# Patient Record
Sex: FEMALE | Race: ASIAN | Employment: FULL TIME | URBAN - METROPOLITAN AREA
[De-identification: names, ages, dates, MRNs, and addresses within clinical notes are randomized per-mention and may not be internally consistent; named-entity substitution may affect disease eponyms.]

---

## 2019-06-03 LAB
HBV SURFACE AG SERPL QL IA: NONREACTIVE
HIV 1+2 AB+HIV1 P24 AG SERPL QL IA: NON REACTIVE
RUBELLA ABY IGG: NORMAL
RUBELLA ANTIBODY IGG QUANTITATIVE: 64 IU/ML

## 2019-07-08 LAB
C TRACH DNA SPEC QL PROBE+SIG AMP: NEGATIVE
N GONORRHOEA DNA SPEC QL PROBE+SIG AMP: NEGATIVE

## 2019-09-17 ENCOUNTER — TRANSCRIBE ORDERS (OUTPATIENT)
Dept: MATERNAL FETAL MEDICINE | Facility: CLINIC | Age: 39
End: 2019-09-17

## 2019-09-17 DIAGNOSIS — O26.90 PREGNANCY RELATED CONDITION, ANTEPARTUM: Primary | ICD-10-CM

## 2019-09-19 ENCOUNTER — PRE VISIT (OUTPATIENT)
Dept: MATERNAL FETAL MEDICINE | Facility: CLINIC | Age: 39
End: 2019-09-19

## 2019-09-26 ENCOUNTER — HOSPITAL ENCOUNTER (OUTPATIENT)
Dept: ULTRASOUND IMAGING | Facility: CLINIC | Age: 39
Discharge: HOME | End: 2019-09-26
Attending: OBSTETRICS & GYNECOLOGY | Admitting: OBSTETRICS & GYNECOLOGY
Payer: COMMERCIAL

## 2019-09-26 ENCOUNTER — OFFICE VISIT (OUTPATIENT)
Dept: MATERNAL FETAL MEDICINE | Facility: CLINIC | Age: 39
End: 2019-09-26
Attending: OBSTETRICS & GYNECOLOGY
Payer: COMMERCIAL

## 2019-09-26 DIAGNOSIS — O26.90 PREGNANCY RELATED CONDITION, ANTEPARTUM: ICD-10-CM

## 2019-09-26 DIAGNOSIS — O09.522 MULTIGRAVIDA OF ADVANCED MATERNAL AGE IN SECOND TRIMESTER: Primary | ICD-10-CM

## 2019-09-26 PROCEDURE — 76811 OB US DETAILED SNGL FETUS: CPT

## 2019-09-26 NOTE — PROGRESS NOTES
"Please see \"Imaging\" tab under \"Chart Review\" for details of today's US.    Mounika Cosme, DO    "

## 2019-11-11 ENCOUNTER — TRANSFERRED RECORDS (OUTPATIENT)
Dept: HEALTH INFORMATION MANAGEMENT | Facility: CLINIC | Age: 39
End: 2019-11-11

## 2019-11-11 LAB — GLU GEST SCREEN 1HR 50G: 94

## 2019-11-20 ENCOUNTER — OFFICE VISIT (OUTPATIENT)
Dept: OBGYN | Facility: CLINIC | Age: 39
End: 2019-11-20
Attending: ADVANCED PRACTICE MIDWIFE
Payer: COMMERCIAL

## 2019-11-20 VITALS
HEIGHT: 60 IN | WEIGHT: 138.2 LBS | HEART RATE: 91 BPM | BODY MASS INDEX: 27.13 KG/M2 | DIASTOLIC BLOOD PRESSURE: 65 MMHG | SYSTOLIC BLOOD PRESSURE: 99 MMHG

## 2019-11-20 DIAGNOSIS — Z34.03 NORMAL FIRST PREGNANCY IN THIRD TRIMESTER: Primary | ICD-10-CM

## 2019-11-20 PROCEDURE — G0463 HOSPITAL OUTPT CLINIC VISIT: HCPCS | Mod: ZF

## 2019-11-20 SDOH — HEALTH STABILITY: MENTAL HEALTH: HOW OFTEN DO YOU HAVE A DRINK CONTAINING ALCOHOL?: NEVER

## 2019-11-20 ASSESSMENT — ANXIETY QUESTIONNAIRES
GAD7 TOTAL SCORE: 2
1. FEELING NERVOUS, ANXIOUS, OR ON EDGE: SEVERAL DAYS
5. BEING SO RESTLESS THAT IT IS HARD TO SIT STILL: NOT AT ALL
3. WORRYING TOO MUCH ABOUT DIFFERENT THINGS: SEVERAL DAYS
7. FEELING AFRAID AS IF SOMETHING AWFUL MIGHT HAPPEN: NOT AT ALL
6. BECOMING EASILY ANNOYED OR IRRITABLE: NOT AT ALL
2. NOT BEING ABLE TO STOP OR CONTROL WORRYING: NOT AT ALL

## 2019-11-20 ASSESSMENT — MIFFLIN-ST. JEOR: SCORE: 1223.37

## 2019-11-20 ASSESSMENT — PATIENT HEALTH QUESTIONNAIRE - PHQ9
SUM OF ALL RESPONSES TO PHQ QUESTIONS 1-9: 1
5. POOR APPETITE OR OVEREATING: NOT AT ALL

## 2019-11-20 NOTE — PROGRESS NOTES
"[facility]  New Obstetrics Visit    HPI: 39 year old  at 29w4d by 8w2d US here today for initial OB visit, transfer of care from [facility]; pt reports she did not want to deliver at [facility] due to high  rate, her  had suggested delivering at [facility] instead.     Shavonne Vogel reports that overall she is feeling well. She does feel fatigued but baby is moving well, no VB, leaking of fluid, no contractions. She has been having some heartburn, taking Tums PRN. No HA, vision changes, does endorse some SOB, no RUQ, LE swelling. No issues with constipation.     Reports she is still trying to decide about CNM vs. MD care. She has a  already who has been in deliveries at [facility]. Strongly desires medication-free labor but understands the need for  in certain situations. Interested in water birth.     OBHx  OB History    Para Term  AB Living   1 0 0 0 0 0   SAB TAB Ectopic Multiple Live Births   0 0 0 0 0      # Outcome Date GA Lbr Octavio/2nd Weight Sex Delivery Anes PTL Lv   1 Current                GYN History  - LMP: Patient's last menstrual period was 2019.  - Pap Smears: NILM in 3/2019, in her 20s had HPV, High grade dysplasia, had LEEP under general anesthesia   - Sexual Activity: yes, no dyspareunia  - Hx STIs: no  - Hx UTIs: intermittent UTIs, nothing recurrent     PMHx: none   PSHx: LEEP 10+ years ago   Meds:  Prenatal vitamin  Allergies:  NKDA    SocHx: Lives w  \"Ahsan\", , born in [facility] [place], moved in [place] at age 9, went to [employer], [employer] @ [employer]    FamHx: No hx of breast, ovarian, endometrial, colon cancers    ROS: 10-Point ROS negative except as noted in HPI    Preventative Health  Current or historical sexual, physical or mental abuse: none  Feelings of depression: none  Seat belt usage: yes   Diet: no  Exercise: 3-4 days per week, prenatal yoga, barre     Physical Exam  BP 99/65 (BP Location: Right arm, Patient " Position: Chair)   Pulse 91   Ht 1.524 m (5')   Wt 62.7 kg (138 lb 3.2 oz)   LMP 2019   Breastfeeding No   BMI 26.99 kg/m    Gen: Well-appearing, NAD  HEENT: Normocephalic, atraumatic  CV:  Regular rate, well perfused  Pulm: Breathing comfortably on room air  Abd: Soft, non-tender, non-distended  Ext: No LE edema, extremities warm and well perfused    FHT: 151 bpm  Fundal height: 30cm    Labs:  none    Assessment/Plan:  Ms. Shavonne Vogel is a 39 year old  at 29w4d by 8w2d US, here for new OB visit, CHE from Clinic [facility]. Pregnancy is complicated by AMA.     1. PNC: routine ob labs reviewed  - Rh pos, Rubella immune  - GCT, CBC, RPR wnl  - s/p flu shot, Tdap  - pap NILM in 3/2019, HPV neg  - vitamin D ordered today     2. Genetic screening/ diagnosis: low risk NIPT  - choroid plexus cysts and external intracardiac focus seen on outside US, EIF seen on Level 2 scan, can be normal variant in fetuses of  descent. No CPCs seen on Level 2 scan.   - Carrier screen neg    3. Transfer of care/undecided about model of care  - oriented patient to collaborative MD-CNM aspect of practice, reviewed call schedules, structure of prenatal visits  - provided with information on reasons for induction, indications for  section   - pt has already been on hospital tour  - recommended consultation w midwife group re: interest in water birth    Follow up in 2 weeks.    Staffed w Dr. Heber Putnam MD  Obstetrics and Gynecology PGY-1  19    I saw this patient with the resident and agree with the resident s findings and plan of care as documented in the resident s note.  I personally reviewed prenatal care to date.  Key findings: trying to find best practice model for her desires for prenatal care, discussed our clinics approach to care, CNM and MD approach to care, plans to meet with CNMS to discuss water birth consideration.  Aware of collaborative practice between CNM and  MD.    Sri Buck MD

## 2019-11-20 NOTE — LETTER
"2019       RE: Shavonne Vogel  [address]  [place] [address]     Dear Colleague,    Thank you for referring your patient, Shavonne Vogel, to the [facility] at [facility]. Please see a copy of my visit note below.    [email or web address]  New Obstetrics Visit    HPI: 39 year old  at 29w4d by 8w2d US here today for initial OB visit, transfer of care from [facility]; pt reports she did not want to deliver at [facility] due to high  rate, her  had suggested delivering at [facility] instead.     Shavonne Vogel reports that overall she is feeling well. She does feel fatigued but baby is moving well, no VB, leaking of fluid, no contractions. She has been having some heartburn, taking Tums PRN. No HA, vision changes, does endorse some SOB, no RUQ, LE swelling. No issues with constipation.     Reports she is still trying to decide about CNM vs. MD care. She has a  already who has been in deliveries at [facility]. Strongly desires medication-free labor but understands the need for  in certain situations. Interested in water birth.     OBHx  OB History    Para Term  AB Living   1 0 0 0 0 0   SAB TAB Ectopic Multiple Live Births   0 0 0 0 0      # Outcome Date GA Lbr Octavio/2nd Weight Sex Delivery Anes PTL Lv   1 Current                GYN History  - LMP: Patient's last menstrual period was 2019.  - Pap Smears: NILM in 3/2019, in her 20s had HPV, High grade dysplasia, had LEEP under general anesthesia   - Sexual Activity: yes, no dyspareunia  - Hx STIs: no  - Hx UTIs: intermittent UTIs, nothing recurrent     PMHx: none   PSHx: LEEP 10+ years ago   Meds:  Prenatal vitamin  Allergies:  NKDA    SocHx: Lives w  \"Ahsan\", , born in [facility] [place], moved in [place] at age 9, went to [employer], [employer] school @ [employer]    FamHx: No hx of breast, ovarian, endometrial, colon cancers    ROS: 10-Point ROS negative " except as noted in HPI    Preventative Health  Current or historical sexual, physical or mental abuse: none  Feelings of depression: none  Seat belt usage: yes   Diet: no  Exercise: 3-4 days per week, prenatal yoga, barre     Physical Exam  BP 99/65 (BP Location: Right arm, Patient Position: Chair)   Pulse 91   Ht 1.524 m (5')   Wt 62.7 kg (138 lb 3.2 oz)   LMP 2019   Breastfeeding No   BMI 26.99 kg/m     Gen: Well-appearing, NAD  HEENT: Normocephalic, atraumatic  CV:  Regular rate, well perfused  Pulm: Breathing comfortably on room air  Abd: Soft, non-tender, non-distended  Ext: No LE edema, extremities warm and well perfused    FHT: 151 bpm  Fundal height: 30cm    Labs:  none    Assessment/Plan:  Ms. Shavonne Vogel is a 39 year old  at 29w4d by 8w2d US, here for new OB visit, CHE from Clinic [facility]. Pregnancy is complicated by AMA.     1. PNC: routine ob labs reviewed  - Rh pos, Rubella immune  - GCT, CBC, RPR wnl  - s/p flu shot, Tdap  - pap NILM in 3/2019, HPV neg  - vitamin D ordered today     2. Genetic screening/ diagnosis: low risk NIPT  - choroid plexus cysts and external intracardiac focus seen on outside US, EIF seen on Level 2 scan, can be normal variant in fetuses of  descent. No CPCs seen on Level 2 scan.   - Carrier screen neg    3. Transfer of care/undecided about model of care  - oriented patient to collaborative MD-CNM aspect of practice, reviewed call schedules, structure of prenatal visits  - provided with information on reasons for induction, indications for  section   - pt has already been on hospital tour  - recommended consultation w midwife group re: interest in water birth    Follow up in 2 weeks.    Staffed w Dr. Heber Putnam MD  Obstetrics and Gynecology PGY-1  19    I saw this patient with the resident and agree with the resident s findings and plan of care as documented in the resident s note.  I personally reviewed prenatal care  to date.  Key findings: trying to find best practice model for her desires for prenatal care, discussed our clinics approach to care, CNM and MD approach to care, plans to meet with CNMS to discuss water birth consideration.  Aware of collaborative practice between CNM and MD.    Sri Buck MD

## 2019-11-20 NOTE — NURSING NOTE
"Chief Complaint   Patient presents with     Prenatal Care     29w4d, pt is \"exploring\" and looking for a clinic that delivers here at the birthplace. She is looking for a hospital that has a NICU and is more natural in labors.       See Samantha, CONNER 11/20/2019  "

## 2019-11-21 ASSESSMENT — ANXIETY QUESTIONNAIRES: GAD7 TOTAL SCORE: 2

## 2019-12-13 ENCOUNTER — OFFICE VISIT (OUTPATIENT)
Dept: OBGYN | Facility: CLINIC | Age: 39
End: 2019-12-13
Attending: ADVANCED PRACTICE MIDWIFE
Payer: COMMERCIAL

## 2019-12-13 VITALS
DIASTOLIC BLOOD PRESSURE: 74 MMHG | HEART RATE: 92 BPM | SYSTOLIC BLOOD PRESSURE: 105 MMHG | WEIGHT: 138.7 LBS | HEIGHT: 60 IN | BODY MASS INDEX: 27.23 KG/M2

## 2019-12-13 DIAGNOSIS — O09.519 SUPERVISION OF PRIMIGRAVIDA OF ADVANCED MATERNAL AGE, ANTEPARTUM: ICD-10-CM

## 2019-12-13 DIAGNOSIS — Z34.03 NORMAL FIRST PREGNANCY IN THIRD TRIMESTER: Primary | ICD-10-CM

## 2019-12-13 LAB
BASOPHILS # BLD AUTO: 0 10E9/L (ref 0–0.2)
BASOPHILS NFR BLD AUTO: 0 %
DIFFERENTIAL METHOD BLD: ABNORMAL
EOSINOPHIL # BLD AUTO: 0 10E9/L (ref 0–0.7)
EOSINOPHIL NFR BLD AUTO: 0 %
ERYTHROCYTE [DISTWIDTH] IN BLOOD BY AUTOMATED COUNT: 12.6 % (ref 10–15)
HCT VFR BLD AUTO: 36.5 % (ref 35–47)
HGB BLD-MCNC: 12.3 G/DL (ref 11.7–15.7)
LYMPHOCYTES # BLD AUTO: 1.5 10E9/L (ref 0.8–5.3)
LYMPHOCYTES NFR BLD AUTO: 13.2 %
MCH RBC QN AUTO: 32.2 PG (ref 26.5–33)
MCHC RBC AUTO-ENTMCNC: 33.7 G/DL (ref 31.5–36.5)
MCV RBC AUTO: 96 FL (ref 78–100)
METAMYELOCYTES # BLD: 0.1 10E9/L
METAMYELOCYTES NFR BLD MANUAL: 0.9 %
MONOCYTES # BLD AUTO: 0.8 10E9/L (ref 0–1.3)
MONOCYTES NFR BLD AUTO: 7 %
NEUTROPHILS # BLD AUTO: 8.7 10E9/L (ref 1.6–8.3)
NEUTROPHILS NFR BLD AUTO: 78.9 %
PLATELET # BLD AUTO: 253 10E9/L (ref 150–450)
PLATELET # BLD EST: ABNORMAL 10*3/UL
RBC # BLD AUTO: 3.82 10E12/L (ref 3.8–5.2)
RBC MORPH BLD: NORMAL
WBC # BLD AUTO: 11 10E9/L (ref 4–11)

## 2019-12-13 PROCEDURE — 85025 COMPLETE CBC W/AUTO DIFF WBC: CPT | Performed by: MIDWIFE

## 2019-12-13 PROCEDURE — 86780 TREPONEMA PALLIDUM: CPT | Performed by: MIDWIFE

## 2019-12-13 PROCEDURE — 36415 COLL VENOUS BLD VENIPUNCTURE: CPT | Performed by: MIDWIFE

## 2019-12-13 PROCEDURE — 82306 VITAMIN D 25 HYDROXY: CPT | Performed by: MIDWIFE

## 2019-12-13 PROCEDURE — G0463 HOSPITAL OUTPT CLINIC VISIT: HCPCS | Mod: ZF

## 2019-12-13 ASSESSMENT — PAIN SCALES - GENERAL: PAINLEVEL: NO PAIN (0)

## 2019-12-13 ASSESSMENT — MIFFLIN-ST. JEOR: SCORE: 1225.64

## 2019-12-13 NOTE — PROGRESS NOTES
Subjective:      39 year old  at 32w6d presentst for a routine prenatal appointment.   Denies vaginal bleeding or leakage of fluid.  No contractions. Good fetal movement.      No HA, visual changes, RUQ or epigastric pain.   Patient concerns: Feeling well overall.  - Interested in water birth. Was given consent and discussed. Wants to talk with partner and .   - Pain in right heel during yoga several times.   - Wondering about Vit D. Should she check?   - Had TDAP previously     Objective:  Vitals:    19 1453   BP: 105/74   Pulse: 92   Weight: 62.9 kg (138 lb 11.2 oz)   Height: 1.524 m (5')   , see ob flowsheet    Assessment/Plan     Encounter Diagnosis   Name Primary?     Normal first pregnancy in third trimester Yes     Orders Placed This Encounter   Procedures     Treponema Abs w Reflex to RPR and Titer     CBC with platelets differential     Vitamin D Deficiency     No orders of the defined types were placed in this encounter.    No results found for: ABO, RH, AS,     Rhogam  was not indicated.    - Will review WB consent and sign at next visit if interested. Encouraged to sign if she wants the option.  - Recommended [employer] website     - Reviewed total weight gain, encouraged continued healthy diet and exercise.    - Reviewed importance of daily fetal kick count and why/how to contact provider.  - Reviewed why/how to contact provider if headache/visual changes/RUQ or epigastric pain, decreased fetal movement, vaginal bleeding, leakage of fluid or more than 4 contractions in an hour.     Patient education/orders or handouts today:  PTL signs/symptoms and Childbirth Ed classes  Reviewed GBS screening at 36 wks.    Return to clinic in 2 weeks and prn if questions or concerns.     JA Carvajal, LESLEY

## 2019-12-13 NOTE — LETTER
2019       RE: Shavonne Vogel  [address]  [address]   [place] [address]     Dear Colleague,    Thank you for referring your patient, Shavonne Vogel, to the [facility] at [facility]. Please see a copy of my visit note below.    Subjective:      39 year old  at 32w6d presentst for a routine prenatal appointment.   Denies vaginal bleeding or leakage of fluid.  No contractions. Good fetal movement.      No HA, visual changes, RUQ or epigastric pain.   Patient concerns: Feeling well overall.  - Interested in water birth. Was given consent and discussed. Wants to talk with partner and .   - Pain in right heel during yoga several times.   - Wondering about Vit D. Should she check?   - Had TDAP previously     Objective:  Vitals:    19 1453   BP: 105/74   Pulse: 92   Weight: 62.9 kg (138 lb 11.2 oz)   Height: 1.524 m (5')   see ob flowsheet    Assessment/Plan     Encounter Diagnosis   Name Primary?     Normal first pregnancy in third trimester Yes     Orders Placed This Encounter   Procedures     Treponema Abs w Reflex to RPR and Titer     CBC with platelets differential     Vitamin D Deficiency     No orders of the defined types were placed in this encounter.    No results found for: ABO, RH, AS,     Rhogam  was not indicated.    - Will review WB consent and sign at next visit if interested. Encouraged to sign if she wants the option.  - Recommended [employer] website     - Reviewed total weight gain, encouraged continued healthy diet and exercise.    - Reviewed importance of daily fetal kick count and why/how to contact provider.  - Reviewed why/how to contact provider if headache/visual changes/RUQ or epigastric pain, decreased fetal movement, vaginal bleeding, leakage of fluid or more than 4 contractions in an hour.     Patient education/orders or handouts today:  PTL signs/symptoms and Childbirth Ed classes  Reviewed GBS screening  at 36 wks.    Return to clinic in 2 weeks and prn if questions or concerns.     JA Carvajal, BRANDONM    JA QuintanaM

## 2019-12-14 LAB — T PALLIDUM AB SER QL: NONREACTIVE

## 2019-12-16 LAB — DEPRECATED CALCIDIOL+CALCIFEROL SERPL-MC: 42 UG/L (ref 20–75)

## 2019-12-23 ENCOUNTER — OFFICE VISIT (OUTPATIENT)
Dept: OBGYN | Facility: CLINIC | Age: 39
End: 2019-12-23
Attending: OBSTETRICS & GYNECOLOGY
Payer: COMMERCIAL

## 2019-12-23 VITALS
DIASTOLIC BLOOD PRESSURE: 80 MMHG | BODY MASS INDEX: 27.64 KG/M2 | HEIGHT: 60 IN | WEIGHT: 140.8 LBS | SYSTOLIC BLOOD PRESSURE: 117 MMHG | HEART RATE: 105 BPM

## 2019-12-23 DIAGNOSIS — O09.93 SUPERVISION OF HIGH RISK PREGNANCY IN THIRD TRIMESTER: Primary | ICD-10-CM

## 2019-12-23 DIAGNOSIS — O92.79 LACTATION DISORDER, ANTEPARTUM: ICD-10-CM

## 2019-12-23 DIAGNOSIS — O09.513 AMA (ADVANCED MATERNAL AGE) PRIMIGRAVIDA 35+, THIRD TRIMESTER: ICD-10-CM

## 2019-12-23 PROCEDURE — G0463 HOSPITAL OUTPT CLINIC VISIT: HCPCS | Mod: ZF

## 2019-12-23 RX ORDER — BREAST PUMP
1 EACH MISCELLANEOUS PRN
Qty: 1 EACH | Refills: 0 | Status: SHIPPED | OUTPATIENT
Start: 2019-12-23

## 2019-12-23 ASSESSMENT — MIFFLIN-ST. JEOR: SCORE: 1235.16

## 2019-12-23 NOTE — LETTER
2019       RE: Shavonne Vogel  [address]  [address]   [place] [address]     Dear Colleague,    Thank you for referring your patient, Shavonne Vogel, to [facility] Please see a copy of my visit note below.    S:  Doing well, good FM, no contractions.  Still considering water birth-encouraged to schedule subsequent visits with the CNM team.  Asking about BPPs 2/2 AMA.  Had a breast pump Rx from her previous provider-would like a new one today.      O: see flow    A:  38 y/o at 34+2 weeks, doing well.  Pregnancy complicated by AMA.    P:  She will be 40 years old at delivery-recommended weekly BPPs starting at 37 weeks which she was in agreement with.  GBS at 35-37 weeks.  SANDY in 1-2 weeks with one of the midwives was recommended.  Breast pump Rx given.     Kate Louie MD, FACOG

## 2019-12-23 NOTE — PROGRESS NOTES
S:  Doing well, good FM, no contractions.  Still considering water birth-encouraged to schedule subsequent visits with the CNM team.  Asking about BPPs 2/2 AMA.  Had a breast pump Rx from her previous provider-would like a new one today.      O: see flow    A:  38 y/o at 34+2 weeks, doing well.  Pregnancy complicated by AMA.    P:  She will be 40 years old at delivery-recommended weekly BPPs starting at 37 weeks which she was in agreement with.  GBS at 35-37 weeks.  SANDY in 1-2 weeks with one of the midwives was recommended.  Breast pump Rx given.     Kate Louie MD, FACOG

## 2019-12-23 NOTE — NURSING NOTE
Chief Complaint   Patient presents with     Prenatal Care     34w3d       See CONNER Singh 12/23/2019

## 2019-12-29 ENCOUNTER — TELEPHONE (OUTPATIENT)
Dept: OBGYN | Facility: CLINIC | Age: 39
End: 2019-12-29

## 2019-12-29 NOTE — TELEPHONE ENCOUNTER
Pt calls  g1 at 35 weeks with head cold wondering if Mucinex is safe.  Recommended using only at night.  Tylenol safe.  If develops fever and body aches should be evaluated for influenza.    Roshni Saul MD

## 2019-12-30 ENCOUNTER — HOSPITAL ENCOUNTER (OUTPATIENT)
Facility: CLINIC | Age: 39
Setting detail: OBSERVATION
Discharge: HOME | End: 2019-12-31
Attending: OBSTETRICS & GYNECOLOGY | Admitting: OBSTETRICS & GYNECOLOGY
Payer: COMMERCIAL

## 2019-12-30 ENCOUNTER — TELEPHONE (OUTPATIENT)
Dept: OBGYN | Facility: CLINIC | Age: 39
End: 2019-12-30

## 2019-12-30 ENCOUNTER — APPOINTMENT (OUTPATIENT)
Dept: GENERAL RADIOLOGY | Facility: CLINIC | Age: 39
End: 2019-12-30
Payer: COMMERCIAL

## 2019-12-30 DIAGNOSIS — J18.9 PNEUMONIA OF LEFT LOWER LOBE DUE TO INFECTIOUS ORGANISM: Primary | ICD-10-CM

## 2019-12-30 DIAGNOSIS — R50.9 FEVER, UNSPECIFIED FEVER CAUSE: Primary | ICD-10-CM

## 2019-12-30 PROBLEM — O09.90 PREGNANCY, SUPERVISION FOR, HIGH-RISK: Status: ACTIVE | Noted: 2019-12-30

## 2019-12-30 PROBLEM — O36.8190 DECREASED FETAL MOVEMENT: Status: ACTIVE | Noted: 2019-12-30

## 2019-12-30 LAB
ALBUMIN UR-MCNC: NEGATIVE MG/DL
APPEARANCE UR: CLEAR
BACTERIA #/AREA URNS HPF: ABNORMAL /HPF
BILIRUB UR QL STRIP: NEGATIVE
COLOR UR AUTO: ABNORMAL
DEPRECATED S PYO AG THROAT QL EIA: NORMAL
ERYTHROCYTE [DISTWIDTH] IN BLOOD BY AUTOMATED COUNT: 12.9 % (ref 10–15)
FLUAV+FLUBV AG SPEC QL: NEGATIVE
FLUAV+FLUBV AG SPEC QL: NEGATIVE
GLUCOSE UR STRIP-MCNC: NEGATIVE MG/DL
HCT VFR BLD AUTO: 38.9 % (ref 35–47)
HGB BLD-MCNC: 13 G/DL (ref 11.7–15.7)
HGB UR QL STRIP: NEGATIVE
KETONES UR STRIP-MCNC: NEGATIVE MG/DL
LEUKOCYTE ESTERASE UR QL STRIP: NEGATIVE
MCH RBC QN AUTO: 31.9 PG (ref 26.5–33)
MCHC RBC AUTO-ENTMCNC: 33.4 G/DL (ref 31.5–36.5)
MCV RBC AUTO: 95 FL (ref 78–100)
NITRATE UR QL: NEGATIVE
PH UR STRIP: 6 PH (ref 5–7)
PLATELET # BLD AUTO: 211 10E9/L (ref 150–450)
RBC # BLD AUTO: 4.08 10E12/L (ref 3.8–5.2)
RBC #/AREA URNS AUTO: 0 /HPF (ref 0–2)
SOURCE: ABNORMAL
SP GR UR STRIP: 1 (ref 1–1.03)
SPECIMEN SOURCE: NORMAL
SPECIMEN SOURCE: NORMAL
SQUAMOUS #/AREA URNS AUTO: <1 /HPF (ref 0–1)
UROBILINOGEN UR STRIP-MCNC: NORMAL MG/DL (ref 0–2)
WBC # BLD AUTO: 25 10E9/L (ref 4–11)
WBC #/AREA URNS AUTO: 0 /HPF (ref 0–5)

## 2019-12-30 PROCEDURE — G0378 HOSPITAL OBSERVATION PER HR: HCPCS

## 2019-12-30 PROCEDURE — 25000132 ZZH RX MED GY IP 250 OP 250 PS 637: Performed by: STUDENT IN AN ORGANIZED HEALTH CARE EDUCATION/TRAINING PROGRAM

## 2019-12-30 PROCEDURE — 87804 INFLUENZA ASSAY W/OPTIC: CPT | Mod: 59 | Performed by: STUDENT IN AN ORGANIZED HEALTH CARE EDUCATION/TRAINING PROGRAM

## 2019-12-30 PROCEDURE — 59025 FETAL NON-STRESS TEST: CPT

## 2019-12-30 PROCEDURE — 71046 X-RAY EXAM CHEST 2 VIEWS: CPT

## 2019-12-30 PROCEDURE — 96361 HYDRATE IV INFUSION ADD-ON: CPT

## 2019-12-30 PROCEDURE — 81001 URINALYSIS AUTO W/SCOPE: CPT | Performed by: STUDENT IN AN ORGANIZED HEALTH CARE EDUCATION/TRAINING PROGRAM

## 2019-12-30 PROCEDURE — 25800030 ZZH RX IP 258 OP 636: Performed by: STUDENT IN AN ORGANIZED HEALTH CARE EDUCATION/TRAINING PROGRAM

## 2019-12-30 PROCEDURE — 25800030 ZZH RX IP 258 OP 636

## 2019-12-30 PROCEDURE — 96360 HYDRATION IV INFUSION INIT: CPT

## 2019-12-30 PROCEDURE — 87081 CULTURE SCREEN ONLY: CPT | Performed by: STUDENT IN AN ORGANIZED HEALTH CARE EDUCATION/TRAINING PROGRAM

## 2019-12-30 PROCEDURE — G0463 HOSPITAL OUTPT CLINIC VISIT: HCPCS | Mod: 25

## 2019-12-30 PROCEDURE — 87880 STREP A ASSAY W/OPTIC: CPT | Performed by: STUDENT IN AN ORGANIZED HEALTH CARE EDUCATION/TRAINING PROGRAM

## 2019-12-30 PROCEDURE — 85027 COMPLETE CBC AUTOMATED: CPT | Performed by: STUDENT IN AN ORGANIZED HEALTH CARE EDUCATION/TRAINING PROGRAM

## 2019-12-30 RX ORDER — LIDOCAINE 40 MG/G
CREAM TOPICAL
Status: DISCONTINUED | OUTPATIENT
Start: 2019-12-30 | End: 2019-12-31 | Stop reason: HOSPADM

## 2019-12-30 RX ORDER — AZITHROMYCIN 250 MG/1
250 TABLET, FILM COATED ORAL DAILY
Status: DISCONTINUED | OUTPATIENT
Start: 2019-12-31 | End: 2019-12-31 | Stop reason: HOSPADM

## 2019-12-30 RX ORDER — OSELTAMIVIR PHOSPHATE 75 MG/1
75 CAPSULE ORAL 2 TIMES DAILY
Qty: 10 CAPSULE | Refills: 0 | Status: ON HOLD | OUTPATIENT
Start: 2019-12-30 | End: 2019-12-31

## 2019-12-30 RX ORDER — ACETAMINOPHEN 325 MG/1
975 TABLET ORAL ONCE
Status: COMPLETED | OUTPATIENT
Start: 2019-12-30 | End: 2019-12-30

## 2019-12-30 RX ORDER — AZITHROMYCIN 250 MG/1
500 TABLET, FILM COATED ORAL ONCE
Status: COMPLETED | OUTPATIENT
Start: 2019-12-30 | End: 2019-12-30

## 2019-12-30 RX ORDER — GUAIFENESIN 600 MG/1
600 TABLET, EXTENDED RELEASE ORAL 2 TIMES DAILY
Status: DISCONTINUED | OUTPATIENT
Start: 2019-12-30 | End: 2019-12-31 | Stop reason: HOSPADM

## 2019-12-30 RX ORDER — ACETAMINOPHEN 325 MG/1
650 TABLET ORAL EVERY 6 HOURS
Status: DISCONTINUED | OUTPATIENT
Start: 2019-12-30 | End: 2019-12-31 | Stop reason: HOSPADM

## 2019-12-30 RX ORDER — FAMOTIDINE 10 MG
10 TABLET ORAL 2 TIMES DAILY
Status: DISCONTINUED | OUTPATIENT
Start: 2019-12-30 | End: 2019-12-31 | Stop reason: HOSPADM

## 2019-12-30 RX ORDER — SODIUM CHLORIDE, SODIUM LACTATE, POTASSIUM CHLORIDE, CALCIUM CHLORIDE 600; 310; 30; 20 MG/100ML; MG/100ML; MG/100ML; MG/100ML
INJECTION, SOLUTION INTRAVENOUS
Status: COMPLETED
Start: 2019-12-30 | End: 2019-12-30

## 2019-12-30 RX ORDER — ONDANSETRON 2 MG/ML
4 INJECTION INTRAMUSCULAR; INTRAVENOUS EVERY 6 HOURS PRN
Status: DISCONTINUED | OUTPATIENT
Start: 2019-12-30 | End: 2019-12-31 | Stop reason: HOSPADM

## 2019-12-30 RX ORDER — SODIUM CHLORIDE, SODIUM LACTATE, POTASSIUM CHLORIDE, CALCIUM CHLORIDE 600; 310; 30; 20 MG/100ML; MG/100ML; MG/100ML; MG/100ML
INJECTION, SOLUTION INTRAVENOUS CONTINUOUS
Status: DISCONTINUED | OUTPATIENT
Start: 2019-12-30 | End: 2019-12-31 | Stop reason: HOSPADM

## 2019-12-30 RX ADMIN — FAMOTIDINE 10 MG: 10 TABLET ORAL at 20:17

## 2019-12-30 RX ADMIN — SODIUM CHLORIDE, POTASSIUM CHLORIDE, SODIUM LACTATE AND CALCIUM CHLORIDE: 600; 310; 30; 20 INJECTION, SOLUTION INTRAVENOUS at 14:01

## 2019-12-30 RX ADMIN — AZITHROMYCIN MONOHYDRATE 500 MG: 250 TABLET ORAL at 20:17

## 2019-12-30 RX ADMIN — ACETAMINOPHEN 650 MG: 325 TABLET, FILM COATED ORAL at 22:23

## 2019-12-30 RX ADMIN — GUAIFENESIN 600 MG: 600 TABLET, EXTENDED RELEASE ORAL at 21:30

## 2019-12-30 RX ADMIN — SODIUM CHLORIDE, POTASSIUM CHLORIDE, SODIUM LACTATE AND CALCIUM CHLORIDE 1000 ML: 600; 310; 30; 20 INJECTION, SOLUTION INTRAVENOUS at 12:51

## 2019-12-30 RX ADMIN — SODIUM CHLORIDE, POTASSIUM CHLORIDE, SODIUM LACTATE AND CALCIUM CHLORIDE: 600; 310; 30; 20 INJECTION, SOLUTION INTRAVENOUS at 21:59

## 2019-12-30 RX ADMIN — ACETAMINOPHEN 975 MG: 325 TABLET, FILM COATED ORAL at 12:51

## 2019-12-30 ASSESSMENT — MIFFLIN-ST. JEOR: SCORE: 1231.54

## 2019-12-30 NOTE — TELEPHONE ENCOUNTER
Received call from Shavonne stating that she has not felt her baby move since last night and he is usually 'very active' in the morning.  Of note, she reports fever and spoke with provider during the night last night.    Advised she go to Birthplace for evaluation and she agreed with plan. Informed her on where to go. She had no further questions.    Called [facility]place and gave report. Pt sees the MDs.

## 2019-12-30 NOTE — PROGRESS NOTES
[facility]  OB H&P    CC: Fevers, decreased fetal movement.     HPI: Ms. Shavonne Vogel is a 39 year old  at 35w2d by LMP c/w 8w US, who presents with decreased fetal movement overnight. She denies leaking fluid, vaginal bleeding. Reports that fetal movement has improved. Reports intermittent mild contractions - non-painful tightening which has not progressed.     She reports scratchy throat beginning on Friday. This then progressed to a non-productive cough on Saturday. Fevers began yesterday. Tmax 103.5 F at 0030 this morning. Has been taking tylenol with decrease in fever but without defervescence. Has also been taking Mucinex without improvement. Reports bilateral temporal headache. Denies myalgias, dysuria, change in urinary frequency, change in vaginal discharge. Denies vision changes, SOB, chest pain, RUQ pain, epigastric pain, increased edema.    Reports right wrist pain which has been bothersome over the last several days. Pain comes and goes.     Pregnancy complications:  - AMA    O:  Patient Vitals for the past 24 hrs:   BP Temp Temp src Resp Height Weight   19 1352 -- 100.2  F (37.9  C) Oral -- -- --   19 1303 109/59 101.2  F (38.4  C) Oral 16 -- --   19 1205 102/66 100.3  F (37.9  C) Oral 16 1.524 m (5') 63.5 kg (140 lb)     Gen: Ill appearing, no distress  CV: Regular rate, regular rhythm   Pulm: Non-labored breathing on room air, CTAB  Abd: Soft, gravid, non-tender   MSK: Positive Tinel sign right wrist   Ext: No LE edema b/l    FHT: Baseline 160 on arrival, improved to 150 baseline with fluids, moderate variability, accelerations present, no decelerations  [place]: 3-4 ctx in 10 mins, spaced to 1 in 10 with fluids.     Labs:  Influenza A/B- negative  WBC 25  UA- negative  Rapid strep throat swab - negative    Imaging:     CXR: Impression: Patchy opacity in the left lower zone, may represent  infection versus atelectasis.    A/P:  Ms. Shavonne Vogel is a 39  year old  at 35w2d by LMP c/w 8w US here with history of fevers, malaise, cough. Workup negative for influenza A/B antigen, strep throat. UA negative for infection. Notable for leukocytosis with WBC 25. CXR done that showed possible infection. Given patient's clinical presentation with cough, malaise, fever, leukocytosis in setting of CXR that is concerning for LLL pneumonia, will treat patient for community acquired pneumonia    #Suspected community acquired pneumonia   - Leukocytosis- WBC 25.   - Influenza A/B negative, discontinue Tamiflu   - Will start on course of azithromycin   - continue PRN acetaminophen for fevers     #FWB:  - Category I FHT, reactive. Improved with IV fluids.  - Contractions spaced with fluids, no signs or symptoms of  labor   - TID fetal monitoring    Patient seen and discussed with Dr. Franklin who is in agreement with plan.    Maia Arizmendi MD   OB/GYN Resident PGY-3  2019 9:39 PM

## 2019-12-30 NOTE — PROGRESS NOTES
"Returned answering service call for \"fever\" at 1:33 AM.  Pt states she had talked to physician earlier in the day regarding cough x 1-2 days, known sick contact at work and was told to call back if she had fever.  She just took temperature and was 102.4.  Reviewed treating presumptively for influenza (Rx sent) and recommended Tylenol for fever (reviewed dose and frequency), present to L&D with any SOB, inability to hydrate, worsening myalgia/chills.    Miryam Kenney MD MPH     "

## 2019-12-31 ENCOUNTER — HOSPITAL ENCOUNTER (OUTPATIENT)
Facility: CLINIC | Age: 39
Setting detail: OBSERVATION
End: 2019-12-31
Admitting: OBSTETRICS & GYNECOLOGY
Payer: COMMERCIAL

## 2019-12-31 VITALS
HEIGHT: 60 IN | HEART RATE: 84 BPM | TEMPERATURE: 97.4 F | OXYGEN SATURATION: 97 % | BODY MASS INDEX: 27.48 KG/M2 | RESPIRATION RATE: 18 BRPM | WEIGHT: 140 LBS | SYSTOLIC BLOOD PRESSURE: 82 MMHG | DIASTOLIC BLOOD PRESSURE: 52 MMHG

## 2019-12-31 LAB
BASOPHILS # BLD AUTO: 0 10E9/L (ref 0–0.2)
BASOPHILS NFR BLD AUTO: 0.2 %
DIFFERENTIAL METHOD BLD: ABNORMAL
EOSINOPHIL # BLD AUTO: 0.1 10E9/L (ref 0–0.7)
EOSINOPHIL NFR BLD AUTO: 0.3 %
ERYTHROCYTE [DISTWIDTH] IN BLOOD BY AUTOMATED COUNT: 13.1 % (ref 10–15)
HCT VFR BLD AUTO: 34 % (ref 35–47)
HGB BLD-MCNC: 11.3 G/DL (ref 11.7–15.7)
IMM GRANULOCYTES # BLD: 0.1 10E9/L (ref 0–0.4)
IMM GRANULOCYTES NFR BLD: 0.8 %
LYMPHOCYTES # BLD AUTO: 0.7 10E9/L (ref 0.8–5.3)
LYMPHOCYTES NFR BLD AUTO: 5.1 %
MCH RBC QN AUTO: 31.6 PG (ref 26.5–33)
MCHC RBC AUTO-ENTMCNC: 33.2 G/DL (ref 31.5–36.5)
MCV RBC AUTO: 95 FL (ref 78–100)
MONOCYTES # BLD AUTO: 1.2 10E9/L (ref 0–1.3)
MONOCYTES NFR BLD AUTO: 8.1 %
NEUTROPHILS # BLD AUTO: 12.3 10E9/L (ref 1.6–8.3)
NEUTROPHILS NFR BLD AUTO: 85.5 %
NRBC # BLD AUTO: 0 10*3/UL
NRBC BLD AUTO-RTO: 0 /100
PLATELET # BLD AUTO: 172 10E9/L (ref 150–450)
RBC # BLD AUTO: 3.58 10E12/L (ref 3.8–5.2)
WBC # BLD AUTO: 14.4 10E9/L (ref 4–11)

## 2019-12-31 PROCEDURE — 25000132 ZZH RX MED GY IP 250 OP 250 PS 637: Performed by: STUDENT IN AN ORGANIZED HEALTH CARE EDUCATION/TRAINING PROGRAM

## 2019-12-31 PROCEDURE — 96361 HYDRATE IV INFUSION ADD-ON: CPT

## 2019-12-31 PROCEDURE — G0378 HOSPITAL OBSERVATION PER HR: HCPCS

## 2019-12-31 PROCEDURE — 25800030 ZZH RX IP 258 OP 636: Performed by: STUDENT IN AN ORGANIZED HEALTH CARE EDUCATION/TRAINING PROGRAM

## 2019-12-31 PROCEDURE — 36415 COLL VENOUS BLD VENIPUNCTURE: CPT | Performed by: STUDENT IN AN ORGANIZED HEALTH CARE EDUCATION/TRAINING PROGRAM

## 2019-12-31 PROCEDURE — 85025 COMPLETE CBC W/AUTO DIFF WBC: CPT | Performed by: STUDENT IN AN ORGANIZED HEALTH CARE EDUCATION/TRAINING PROGRAM

## 2019-12-31 RX ORDER — AZITHROMYCIN 250 MG/1
250 TABLET, FILM COATED ORAL DAILY
Qty: 3 TABLET | Refills: 0 | Status: SHIPPED | OUTPATIENT
Start: 2020-01-01 | End: 2020-01-08

## 2019-12-31 RX ADMIN — SODIUM CHLORIDE, POTASSIUM CHLORIDE, SODIUM LACTATE AND CALCIUM CHLORIDE: 600; 310; 30; 20 INJECTION, SOLUTION INTRAVENOUS at 05:52

## 2019-12-31 RX ADMIN — FAMOTIDINE 10 MG: 10 TABLET ORAL at 07:26

## 2019-12-31 RX ADMIN — BENZOCAINE, MENTHOL 1 LOZENGE: 15; 3.6 LOZENGE ORAL at 07:26

## 2019-12-31 RX ADMIN — AZITHROMYCIN MONOHYDRATE 250 MG: 250 TABLET ORAL at 07:26

## 2019-12-31 RX ADMIN — GUAIFENESIN 600 MG: 600 TABLET, EXTENDED RELEASE ORAL at 08:47

## 2019-12-31 RX ADMIN — BENZOCAINE, MENTHOL 1 LOZENGE: 15; 3.6 LOZENGE ORAL at 04:19

## 2019-12-31 RX ADMIN — ACETAMINOPHEN 650 MG: 325 TABLET, FILM COATED ORAL at 04:19

## 2019-12-31 RX ADMIN — BENZOCAINE, MENTHOL 1 LOZENGE: 15; 3.6 LOZENGE ORAL at 06:04

## 2019-12-31 NOTE — PROVIDER NOTIFICATION
12/30/19 2147   Provider Notification   Provider Name/Title Dr. Arizmendi   Method of Notification Electronic Page   Request Evaluate - Remote   Notification Reason Pain   Patient feeling achy and chills. Paged to ask if patient is able to receive any further tylenol.

## 2019-12-31 NOTE — PLAN OF CARE
"  35+2weeks gestation, here with c/o decreased fetal movement .  Fetal and uterine monitors applied, intake interview completed.  FHTs are tachycardic with accelerations, toco ivan regularly pt calls them \"Anderson Jeff and not painful\" , medical history remarkable for high fever (103) brought down by Tylenol.  Dr Arizmendi notified of patient's arrival and RN assessment.  RN performed IV start.  Will collect nasal swab for influenza.  Will await Dr Arizmendi's assessment and plan.    "
AFVSS. Patient continues to have dry cough. Stated feeling better at 1900. Starting to feel aching and chills at 2130. Medicated with tylenol and guaifenesin as ordered. Fetal heart tracing with moderate variability, normal rate, accelerations and no decelerations. Contractions every 3-5 minutes, palpate mild, patient not feeling. Dr. Arizmendi reviewed tracing and states to take off monitor. If patient has increased temp or continues to complain of worsening aching or chills will put back on monitors. Plan to reassess in two hours or sooner if patient calls. Settled for sleep. Will continue to monitor closely.  
AFVSS. Patient sleeping intermittently. States frequent coughing waking her. Denies pain, aching or chills. Orders obtained and patient given throat lozenge. Settled for sleep. Instructed to call if unable to sleep, feeling chills, aching, or any other concerns. Will reassess at 0600 or sooner if patient calls. Will continue to monitor for s/s infection/ptl. Continue present cares.  
Care assumed at 1430 - 1920.  Pt in stable condition.  Denied headache, chills, feeling feverish, GI symptoms.  Complained of general aches, fatigue, cough.  Dr Arizmendi and Dr Buck notified of negative influenza and strep results.  Dr Franklin to bedside to discuss plan of care at 1800.  Plan made for pt to have chest xray, stay overnight on continuous monitoring.  Report given to Karmen SHEPPARD at 1920.  
Nasal swab sent for Influenza, resulted negative.  Pt states she feels better after Tylenol received at 1300. No longer chilling, still feels achy.  Baseline  compared to 170 on admission. Reactive NST.  Respiratory Precautions implemented.  Notify Dr Arizmendi. Report to Ermelinda Ellison RN.      
Shavonne Vogel admitted for evaluation and treatment of decreased fetal movement, fever and chills..    fetal status: Reactive.  NST    Medications given during stay: see MAR, medications prescribed at discharge: Azithromycin sent to pt's pharmacy.    Written instructions given to patient.  Additional documents provided: pneumonia and fetal kick counts and pt verbalized understanding.  Copy in electronic medical record.  Discharged Home undelivered at 1145 with instructiions        Follow up clinic appointment: Thursday, Jan 2 as scheduled.            
Principal Discharge DX:	Fall

## 2019-12-31 NOTE — DISCHARGE INSTRUCTIONS
Discharge Instructions for Undelivered Patients    Diet:  * Drink 8 to 12 glasses of liquids (milk, juice, water) every day  * You may eat meals and snacks.    Activity:  * Count fetal kicks every day (see handout).  * Call your doctor or nurse midwife if your baby is moving less than usual.    Call your provider if you notice:  * Swelling in your face or increased swelling in your hands or legs.  * Headaches that are not relieved by Tylenol (acetaminophen).  * Changes in your vision (blurring; seeing spots or stars).  * Nausea (sick to your stomach) and vomiting (throwing up).  * Weight gain of 5 pounds per week.  * Heartburn that doesn't go away.  * Signs of bladder infection: Pain when you urinate (use the toilet), needing to go more often or more urgently.  * The bag of toure (membrane) breaks, or you notice leaking in your underwear.  * Bright red blood in your underwear.  * Abdominal (lower belly) or stomach pain.  * For first baby: Contractions (tightenings) less than 5 minutes apart for one hour or more.  * Second (plus) baby: Contractions (tightenings) less than 10 minutes apart and getting stronger.  * Increase or change in vaginal discharge (note the color and amount).

## 2019-12-31 NOTE — DISCHARGE SUMMARY
[facility] Discharge Summary    Shavonne Vogel MRN# [id number]   Age: 39 year old YOB: 1980     Date of Admission:  2019  Date of Discharge::  19  Admitting Physician:  Arelis Franklin MD  Discharge Physician:  Haley Briseno MD          Admission Diagnoses:   - at 35w3d  -Fever of unknown etiology  -Advanced maternal age          Discharge Diagnosis:   - at 35+4  -Suspected community acquired pneumonia           Procedures:   Procedure(s): Fetal monitoring   CXR            Medications Prior to Admission:     Medications Prior to Admission   Medication Sig Dispense Refill Last Dose     oseltamivir (TAMIFLU) 75 MG capsule Take 1 capsule (75 mg) by mouth 2 times daily 10 capsule 0 2019 at Unknown time     Prenatal Multivit-Min-Fe-FA (PRENATAL VITAMINS PO)    Past Week at 0800     Misc. Devices (BREAST PUMP) MISC 1 each as needed (need to express breast milk) 1 each 0              Discharge Medications:        Review of your medicines      START taking      Dose / Directions   azithromycin 250 MG tablet  Commonly known as:  ZITHROMAX  Indication:  Community Acquired Pneumonia  Used for:  Pneumonia of left lower lobe due to infectious organism (H)      Dose:  250 mg  Start taking on:  2020  Take 1 tablet (250 mg) by mouth daily  Quantity:  3 tablet  Refills:  0        CONTINUE these medicines which have NOT CHANGED      Dose / Directions   breast pump Misc  Used for:  Lactation disorder, antepartum      Dose:  1 each  1 each as needed (need to express breast milk)  Quantity:  1 each  Refills:  0     PRENATAL VITAMINS PO      Refills:  0        STOP taking    oseltamivir 75 MG capsule  Commonly known as:  TAMIFLU              Where to get your medicines      These medications were sent to [id number] - [place] [address] & [address] [address]    Phone:  [phone]      azithromycin 250 MG tablet               Consultations:   None          Brief Admission History   Ms. Shavonne Vogel is a 39 year old  at 35w2d by LMP c/w 8w US, who presents with decreased fetal movement overnight. She denies leaking fluid, vaginal bleeding. Reports that fetal movement has improved. Reports intermittent mild contractions - non-painful tightening which has not progressed.      She reports scratchy throat beginning on Friday. This then progressed to a non-productive cough on Saturday. Fevers began yesterday. Tmax 103.5 F at 0030 this morning. Has been taking tylenol with decrease in fever but without defervescence. Has also been taking Mucinex without improvement. Reports bilateral temporal headache. Denies myalgias, dysuria, change in urinary frequency, change in vaginal discharge. Denies vision changes, SOB, chest pain, RUQ pain, epigastric pain, increased edema.     Reports right wrist pain which has been bothersome over the last several days. Pain comes and goes.            Hospital Course:   Ms. Shavonne Vogel is a 39 year old  at 35w2d by LMP c/w 8w US here with history of fevers, malaise, cough. Workup negative for influenza A/B antigen, strep throat. UA negative for infection. Notable for leukocytosis with WBC 25. CXR done that showed possible infection. Given patient's clinical presentation with cough, malaise, fever, leukocytosis in setting of CXR that is concerning for LLL pneumonia, will treat patient for community acquired pneumonia. Patient was started on course of azithromycin. Patient remained afebrile throughout hospitalization. Repeat WBC was 14.           Discharge Instructions and Follow-Up:   Discharge diet: Regular   Discharge activity: Regular   Discharge follow-up: Patient has SANDY with CNM on      Discharge return precautions: Bleeding, leaking of fluid, regular painful contractions, fevers, chills, decreased fetal movement.          Discharge Disposition:    Discharged to home      Kiko Chisholm MD  Obstetrics & Gynecology, PGY3      Appreciate note by Dr. Chisholm. Patient has been seen and examined by me separate from the resident, agree with above note.     Haley Briseno MD  2:04 PM

## 2019-12-31 NOTE — H&P
[facility]  OB H&P    CC: Fevers, decreased fetal movement.     HPI: Ms. Shavonne Vogel is a 39 year old  at 35w2d by LMP c/w 8w US, who presents with decreased fetal movement overnight. She denies leaking fluid, vaginal bleeding. Reports that fetal movement has improved. Reports intermittent mild contractions - non-painful tightening which has not progressed.     She reports scratchy throat beginning on Friday. This then progressed to a non-productive cough on Saturday. Fevers began yesterday. Tmax 103.5 F at 0030 this morning. Has been taking tylenol with decrease in fever but without defervescence. Has also been taking Mucinex without improvement. Reports bilateral temporal headache. Denies myalgias, dysuria, change in urinary frequency, change in vaginal discharge. Denies vision changes, SOB, chest pain, RUQ pain, epigastric pain, increased edema.    Reports right wrist pain which has been bothersome over the last several days. Pain comes and goes.     Pregnancy complications:  - AMA    O:  Patient Vitals for the past 24 hrs:   BP Temp Temp src Resp Height Weight   19 1352 -- 100.2  F (37.9  C) Oral -- -- --   19 1303 109/59 101.2  F (38.4  C) Oral 16 -- --   19 1205 102/66 100.3  F (37.9  C) Oral 16 1.524 m (5') 63.5 kg (140 lb)     Gen: Ill appearing, no distress  CV: Regular rate, regular rhythm   Pulm: Non-labored breathing on room air, CTAB  Abd: Soft, gravid, non-tender   MSK: Positive Tinel sign right wrist   Ext: No LE edema b/l    FHT: Baseline 160 on arrival, improved to 150 baseline with fluids, moderate variability, accelerations present, no decelerations  [place]: 3-4 ctx in 10 mins, spaced to 1 in 10 with fluids.     Labs:  Influenza A/B- negative  WBC 25  UA- negative  Rapid strep throat swab - negative    Imaging:     CXR: Impression: Patchy opacity in the left lower zone, may represent  infection versus atelectasis.    A/P:  Ms. Shavonne Vogel is a 39  year old  at 35w2d by LMP c/w 8w US here with history of fevers, malaise, cough. Workup negative for influenza A/B antigen, strep throat. UA negative for infection. Notable for leukocytosis with WBC 25. CXR done that showed possible infection. Given patient's clinical presentation with cough, malaise, fever, leukocytosis in setting of CXR that is concerning for LLL pneumonia, will treat patient for community acquired pneumonia    #Suspected community acquired pneumonia   - Leukocytosis- WBC 25.   - Influenza A/B negative, discontinue Tamiflu   - Will start on course of azithromycin   - continue PRN acetaminophen for fevers     #FWB:  - Category I FHT, reactive. Improved with IV fluids.  - Contractions spaced with fluids, no signs or symptoms of  labor   - TID fetal monitoring    Patient seen and discussed with Dr. Franklin who is in agreement with plan.    Maia Arizmendi MD   OB/GYN Resident PGY-3  2019 9:39 PM    [facility] staff:  Appreciate note by Dr. Arizmendi.  I have seen and examined the patient without the resident. I have reviewed, edited, and agree with the note.      Arelis Franklin MD, FACOG

## 2020-01-01 LAB
BACTERIA SPEC CULT: NORMAL
Lab: NORMAL
SPECIMEN SOURCE: NORMAL

## 2020-01-02 ENCOUNTER — OFFICE VISIT (OUTPATIENT)
Dept: OBGYN | Facility: CLINIC | Age: 40
End: 2020-01-02
Attending: OBSTETRICS & GYNECOLOGY
Payer: COMMERCIAL

## 2020-01-02 VITALS
HEIGHT: 60 IN | DIASTOLIC BLOOD PRESSURE: 66 MMHG | HEART RATE: 92 BPM | BODY MASS INDEX: 27.56 KG/M2 | SYSTOLIC BLOOD PRESSURE: 98 MMHG | WEIGHT: 140.4 LBS

## 2020-01-02 DIAGNOSIS — O09.519 SUPERVISION OF PRIMIGRAVIDA OF ADVANCED MATERNAL AGE, ANTEPARTUM: Primary | ICD-10-CM

## 2020-01-02 PROCEDURE — G0463 HOSPITAL OUTPT CLINIC VISIT: HCPCS | Mod: ZF

## 2020-01-02 ASSESSMENT — MIFFLIN-ST. JEOR: SCORE: 1233.35

## 2020-01-02 ASSESSMENT — ANXIETY QUESTIONNAIRES
3. WORRYING TOO MUCH ABOUT DIFFERENT THINGS: NOT AT ALL
5. BEING SO RESTLESS THAT IT IS HARD TO SIT STILL: NOT AT ALL
7. FEELING AFRAID AS IF SOMETHING AWFUL MIGHT HAPPEN: NOT AT ALL
6. BECOMING EASILY ANNOYED OR IRRITABLE: NOT AT ALL
1. FEELING NERVOUS, ANXIOUS, OR ON EDGE: NOT AT ALL
GAD7 TOTAL SCORE: 0
2. NOT BEING ABLE TO STOP OR CONTROL WORRYING: NOT AT ALL

## 2020-01-02 ASSESSMENT — PAIN SCALES - GENERAL: PAINLEVEL: NO PAIN (0)

## 2020-01-02 ASSESSMENT — PATIENT HEALTH QUESTIONNAIRE - PHQ9
5. POOR APPETITE OR OVEREATING: NOT AT ALL
SUM OF ALL RESPONSES TO PHQ QUESTIONS 1-9: 0

## 2020-01-02 NOTE — LETTER
2020       RE: Shavonne Vogel  [address]  [facility] [facility] [address]     Dear Colleague,    Thank you for referring your patient, Shavonne Vogel, to the [facility] at [facility]. Please see a copy of my visit note below.    Subjective:     39 year old  at 35w5d presents for a routine prenatal appointment.  Denies vaginal bleeding,  leakage of fluid, or change in vaginal discharge.  Denies contractions.  + good fetal movement.       No HA, visual changes, RUQ or epigastric pain.   Patient concerns:     - Was seen on [facility] few days ago and diagnosed/treated for pneumonia, one more day of abx; reports symptoms have been improving. Denies any fevers.   - Questions re water birth and risks, clarified about CNM or MD care    Objective:  Vitals:    20 1359   BP: 98/66   Pulse: 92   Weight: 63.7 kg (140 lb 6.4 oz)   Height: 1.524 m (5')    See OB flowsheet    Assessment/Plan:  Encounter Diagnosis   Name Primary?     Supervision of primigravida of advanced maternal age, antepartum Yes     Orders Placed This Encounter   Procedures     US OB Fetal Biophys Prf wo NonStrs Singls Sgl     Orders Placed This Encounter   Medications     guaiFENesin (MUCINEX PO)     PHQ-9 SCORE 2019   PHQ-9 Total Score 1 0     Birth preferences reviewed: Un-Medicated and Water birth, CNM Care  Labor signs discussed. Reinforced daily fetal movement counts.  Reviewed why/how to contact provider if headache/visual changes/RUQ or epigastric pain, decreased fetal movement, vaginal bleeding, leakage of fluid.  Discussed 41 yo at time of delivery,  surveillance and recommendation for IOL d/t AMA at term.  Pt agreeable to BPPs and will consider IOL recommendation.  Letter provided for work accommodations r/t pregnancy & pneumonia  Water birth consent reviewed and signed  GBS at next visit  Return to clinic in 1 week and prn if questions or concerns.     Leland  JOSETTE Castle, APRN CNM

## 2020-01-02 NOTE — LETTER
January 2, 2020    To Whom It May Concern:    Shavonne Vogel is being seen in our clinic for prenatal care.      Shavonne has a health condition related to pregnancy that requires accommodation. Shavonne is able to continue working with a reasonable accommodation.    I recommend Shavonne be provided the following accommodation: please allow for schedule changes such as a flexible schedules or decreased shift times; as well as, small breaks throughout the shift (every 2-3 hours for a 15 minute break).      This limitation began on 12/30/19.  At this time, I anticipate that Shavonne will need an  accommodation for her limitation for the next 1-2 weeks.     Thank you.     Sincerely,        JA Borja, BRANDONM

## 2020-01-02 NOTE — PROGRESS NOTES
Subjective:     39 year old  at 35w5d presents for a routine prenatal appointment.  Denies vaginal bleeding,  leakage of fluid, or change in vaginal discharge.  Denies contractions.  + good fetal movement.       No HA, visual changes, RUQ or epigastric pain.   Patient concerns:     - Was seen on [facility] few days ago and diagnosed/treated for pneumonia, one more day of abx; reports symptoms have been improving. Denies any fevers.   - Questions re water birth and risks, clarified about CNM or MD care    Objective:  Vitals:    20 1359   BP: 98/66   Pulse: 92   Weight: 63.7 kg (140 lb 6.4 oz)   Height: 1.524 m (5')    See OB flowsheet    Assessment/Plan:  Encounter Diagnosis   Name Primary?     Supervision of primigravida of advanced maternal age, antepartum Yes     Orders Placed This Encounter   Procedures     US OB Fetal Biophys Prf wo NonStrs Singls Sgl     Orders Placed This Encounter   Medications     guaiFENesin (MUCINEX PO)     PHQ-9 SCORE 2019   PHQ-9 Total Score 1 0     Birth preferences reviewed: Un-Medicated and Water birth, CNM Care  Labor signs discussed. Reinforced daily fetal movement counts.  Reviewed why/how to contact provider if headache/visual changes/RUQ or epigastric pain, decreased fetal movement, vaginal bleeding, leakage of fluid.  Discussed 39 yo at time of delivery,  surveillance and recommendation for IOL d/t AMA at term.  Pt agreeable to BPPs and will consider IOL recommendation.  Letter provided for work accommodations r/t pregnancy & pneumonia  Water birth consent reviewed and signed  GBS at next visit  Return to clinic in 1 week and prn if questions or concerns.     Leland Castle, JA CNM

## 2020-01-03 ENCOUNTER — TELEPHONE (OUTPATIENT)
Dept: OBGYN | Facility: CLINIC | Age: 40
End: 2020-01-03

## 2020-01-03 PROBLEM — O09.519 SUPERVISION OF PRIMIGRAVIDA OF ADVANCED MATERNAL AGE, ANTEPARTUM: Status: ACTIVE | Noted: 2019-11-22

## 2020-01-03 ASSESSMENT — ANXIETY QUESTIONNAIRES: GAD7 TOTAL SCORE: 0

## 2020-01-03 NOTE — LETTER
January 3, 2020    Shavonne Vogel  [address] [facility][1] [address]      To Whom It May Concern:     Shavonne Vogel is being seen in our clinic for prenatal care.       Shavonne has a health condition related to pregnancy that requires accommodation. Shavonne is able to continue working with a reasonable accommodation.     I recommend Shavonne be provided the following accommodation: please allow for schedule changes such as a flexible schedule and reducing her shifts by 2 hours; as well as, small breaks throughout the shift (every 2-3 hours for a 15 minute break).      This limitation began on 12/30/19.  At this time, I anticipate that Shavonne will need an  accommodation for her limitation for the next 1-2 weeks.      Thank you.      Sincerely,                 JA Borja, LESLEY

## 2020-01-03 NOTE — LETTER
January 3, 2020    Shavonne Vogel  [address] [facility] [address]      To Whom It May Concern:     Shavonne Vogel is being seen in our clinic for prenatal care.       Shavonne has a health condition related to pregnancy that requires accommodation. Shavonne is able to continue working with a reasonable accommodation.     I recommend Shavonne be provided the following accommodation: please allow for schedule changes such as a flexible schedule or reducing her shifts by 2 hours; as well as, small breaks throughout the shift (every 2-3 hours for a 15 minute break).      This limitation began on 12/30/19.  At this time, I anticipate that Shavonne will need an  accommodation for her limitation for the next 1-2 weeks.      Thank you.      Sincerely,             JA Borja, LESLEY

## 2020-01-08 ENCOUNTER — OFFICE VISIT (OUTPATIENT)
Dept: OBGYN | Facility: CLINIC | Age: 40
End: 2020-01-08
Attending: ADVANCED PRACTICE MIDWIFE
Payer: COMMERCIAL

## 2020-01-08 ENCOUNTER — ANCILLARY PROCEDURE (OUTPATIENT)
Dept: ULTRASOUND IMAGING | Facility: CLINIC | Age: 40
End: 2020-01-08
Attending: ADVANCED PRACTICE MIDWIFE
Payer: COMMERCIAL

## 2020-01-08 VITALS
WEIGHT: 137.3 LBS | SYSTOLIC BLOOD PRESSURE: 107 MMHG | BODY MASS INDEX: 26.81 KG/M2 | HEART RATE: 101 BPM | DIASTOLIC BLOOD PRESSURE: 72 MMHG

## 2020-01-08 DIAGNOSIS — O09.519 SUPERVISION OF PRIMIGRAVIDA OF ADVANCED MATERNAL AGE, ANTEPARTUM: ICD-10-CM

## 2020-01-08 DIAGNOSIS — O09.519 SUPERVISION OF PRIMIGRAVIDA OF ADVANCED MATERNAL AGE, ANTEPARTUM: Primary | ICD-10-CM

## 2020-01-08 PROBLEM — O36.8190 DECREASED FETAL MOVEMENT: Status: RESOLVED | Noted: 2019-12-30 | Resolved: 2020-01-08

## 2020-01-08 LAB
ERYTHROCYTE [DISTWIDTH] IN BLOOD BY AUTOMATED COUNT: 12.9 % (ref 10–15)
HCT VFR BLD AUTO: 35.7 % (ref 35–47)
HGB BLD-MCNC: 12.1 G/DL (ref 11.7–15.7)
MCH RBC QN AUTO: 31.7 PG (ref 26.5–33)
MCHC RBC AUTO-ENTMCNC: 33.9 G/DL (ref 31.5–36.5)
MCV RBC AUTO: 94 FL (ref 78–100)
PLATELET # BLD AUTO: 286 10E9/L (ref 150–450)
RBC # BLD AUTO: 3.82 10E12/L (ref 3.8–5.2)
WBC # BLD AUTO: 9.1 10E9/L (ref 4–11)

## 2020-01-08 PROCEDURE — G0463 HOSPITAL OUTPT CLINIC VISIT: HCPCS

## 2020-01-08 PROCEDURE — 36415 COLL VENOUS BLD VENIPUNCTURE: CPT | Performed by: ADVANCED PRACTICE MIDWIFE

## 2020-01-08 PROCEDURE — 87653 STREP B DNA AMP PROBE: CPT | Performed by: ADVANCED PRACTICE MIDWIFE

## 2020-01-08 PROCEDURE — 85027 COMPLETE CBC AUTOMATED: CPT | Performed by: ADVANCED PRACTICE MIDWIFE

## 2020-01-08 PROCEDURE — 76819 FETAL BIOPHYS PROFIL W/O NST: CPT

## 2020-01-08 NOTE — NURSING NOTE
Chief Complaint   Patient presents with     Prenatal Care     36w4d       See CONNER Singh 1/8/2020

## 2020-01-08 NOTE — LETTER
"2020       RE: Shavonne Vogel  [address]  [facility] [address]     Dear Colleague,    Thank you for referring your patient, Shavonne Vogel, to the [facility] at [facility]. Please see a copy of my visit note below.    Subjective:      39 year old  at 36w4d presents for a routine prenatal appointment.   no vaginal bleeding,  leakage of fluid, or change in vaginal discharge.  no contractions.  + fetal movement.     No HA, visual changes, RUQ or epigastric pain.   Patient concerns:  Feeling well overall.     - Has pneumonia, feeling better but still tired.  Had CBC while in triage but wants again to be sure WBC and Hg are still stable.  - GBS today   - Had BPP for 41yo by GARRET today.  MVP 4.9cm.  CEPHALIC  - work modification letter updated and copy given   - lengthy IOL discussion about rationale for and ways it's done including monson score and length of IOL.   Encouraged spinning babies/cat cow/\"shaking the apples\" rebozzo daily.  Reviewed red raspberry leaf tea, dates, Accupressure to start at 37 weeks.  Avoid evening primrose.  Will continue to discuss.     Objective:  Vitals:    20 1141   BP: 107/72   BP Location: Left arm   Patient Position: Chair   Pulse: 101   Weight: 62.3 kg (137 lb 4.8 oz)   See OB flowsheet    Assessment/Plan     Encounter Diagnosis   Name Primary?     Supervision of primigravida of advanced maternal age, WHS CNM Yes     Orders Placed This Encounter   Procedures     CBC with platelets     PHQ-9 SCORE 2019   PHQ-9 Total Score 1 0     GBS screening: Obtained.  Birth preferences reviewed: Un-Medicated. Had .    Labor signs discussed. Reinforced daily fetal movement counts.  Continue weekly BPP.  Reviewed at length as above IOL rantioanle at 39 weeks. Pt will conitnue to consider.  May have SVE had ~38 weeks to get initial monson score.   Reviewed why/how to contact provider if headache/visual " changes/RUQ or epigastric pain, decreased fetal movement, vaginal bleeding, leakage of fluid.   Return to clinic in 1 week and prn if questions or concerns.     JA SaucedaM

## 2020-01-08 NOTE — LETTER
January 8, 2020        Shavonne Vogel  [address] [facility] [address]      [place] [facility] [address]      To Whom It May Concern:    Shavonne Vogel is being seen in our clinic for prenatal care.      Shavonne has a health condition related to pregnancy that requires accommodation. Shavonne is able to continue working with a reasonable accommodation.    I recommend Shavonne be provided the following accommodation: please allow for schedule changes such as a flexible schedules and decreased shift times of two hours; as well as, small breaks throughout the shift (every 2-3 hours for a 15 minute break).      This limitation began on 12/30/19.  At this time, I anticipate that Shavonne will need an  accommodation for her limitation until her Estimated Date of Delivery: Feb 1, 2020.      Please feel free to contact us at [phone] for any questions or clarifications.       Thank you.     Sincerely,        JA Ruiz CNM

## 2020-01-08 NOTE — LETTER
January 8, 2020        Shavonne Vogel  [address] [facility] [address]    To Whom It May Concern:    Shavonne Vogel is being seen in our clinic for prenatal care.      Shavonne has a health condition related to pregnancy that requires accommodation. Shavonne is able to continue working with a reasonable accommodation.    I recommend Shavonne be provided the following accommodation: please allow for schedule changes such as a flexible schedules or decreased shift times; as well as, small breaks throughout the shift (every 2-3 hours for a 15 minute break).      This limitation began on 12/30/19.  At this time, I anticipate that Shavonne will need an  accommodation for her limitation until her Estimated Date of Delivery: Feb 1, 2020.      Please feel free to contact us at [phone] for any questions or clarifications.       Thank you.     Sincerely,        JA Ruiz CNM

## 2020-01-08 NOTE — PROGRESS NOTES
"Subjective:      39 year old  at 36w4d presents for a routine prenatal appointment.   no vaginal bleeding,  leakage of fluid, or change in vaginal discharge.  no contractions.  + fetal movement.     No HA, visual changes, RUQ or epigastric pain.   Patient concerns:  Feeling well overall.     - Has pneumonia, feeling better but still tired.  Had CBC while in triage but wants again to be sure WBC and Hg are still stable.  - GBS today   - Had BPP for 41yo by GARRET today.  MVP 4.9cm.  CEPHALIC  - work modification letter updated and copy given   - lengthy IOL discussion about rationale for and ways it's done including monson score and length of IOL.   Encouraged spinning babies/cat cow/\"shaking the apples\" rebozzo daily.  Reviewed red raspberry leaf tea, dates, Accupressure to start at 37 weeks.  Avoid evening primrose.  Will continue to discuss.     Objective:  Vitals:    20 1141   BP: 107/72   BP Location: Left arm   Patient Position: Chair   Pulse: 101   Weight: 62.3 kg (137 lb 4.8 oz)    See OB flowsheet    Assessment/Plan     Encounter Diagnosis   Name Primary?     Supervision of primigravida of advanced maternal age, S CNM Yes     Orders Placed This Encounter   Procedures     CBC with platelets         PHQ-9 SCORE 2019   PHQ-9 Total Score 1 0       GBS screening: Obtained.  Birth preferences reviewed: Un-Medicated. Had .    Labor signs discussed. Reinforced daily fetal movement counts.  Continue weekly BPP.  Reviewed at length as above IOL rantioanle at 39 weeks. Pt will conitnue to consider.  May have SVE had ~38 weeks to get initial monson score.   Reviewed why/how to contact provider if headache/visual changes/RUQ or epigastric pain, decreased fetal movement, vaginal bleeding, leakage of fluid.   Return to clinic in 1 week and prn if questions or concerns.     JA Sauceda CNM            "

## 2020-01-09 LAB
GP B STREP DNA SPEC QL NAA+PROBE: NEGATIVE
SPECIMEN SOURCE: NORMAL

## 2020-01-13 ENCOUNTER — TELEPHONE (OUTPATIENT)
Dept: OBGYN | Facility: CLINIC | Age: 40
End: 2020-01-13

## 2020-01-13 NOTE — TELEPHONE ENCOUNTER
"Shavonne had sex last night around 1900 and started to have some uterine cramping after that. Reports this cramping as \"not that bad\" but unlike anything she has experienced prior. She has not had LOF or vaginal and bleeding baby is very active. No other vaginal infection symptoms or signs of UTI. She was able to sleep through the night with these contractions and is just getting up now (0930). Has her next clinic appt on Wed. Has not started to time contractions because she wasn't sure if they were contractions or not. Has been drinking lots of water. Reviewed early labor v BH contractions and how to time ctx. Is planning on eating breakfast, showering and heading into work, will reach out if things  or if LOF, bleeding decrease in fetal mvmnt.   Next SANDY is Wed.       Stacie Ladd CNM    "

## 2020-01-15 ENCOUNTER — OFFICE VISIT (OUTPATIENT)
Dept: OBGYN | Facility: CLINIC | Age: 40
End: 2020-01-15
Attending: MIDWIFE
Payer: COMMERCIAL

## 2020-01-15 ENCOUNTER — ANCILLARY PROCEDURE (OUTPATIENT)
Dept: ULTRASOUND IMAGING | Facility: CLINIC | Age: 40
End: 2020-01-15
Attending: OBSTETRICS & GYNECOLOGY
Payer: COMMERCIAL

## 2020-01-15 VITALS
HEIGHT: 60 IN | SYSTOLIC BLOOD PRESSURE: 92 MMHG | HEART RATE: 79 BPM | BODY MASS INDEX: 27.43 KG/M2 | DIASTOLIC BLOOD PRESSURE: 66 MMHG | WEIGHT: 139.7 LBS

## 2020-01-15 DIAGNOSIS — O09.513 AMA (ADVANCED MATERNAL AGE) PRIMIGRAVIDA 35+, THIRD TRIMESTER: ICD-10-CM

## 2020-01-15 DIAGNOSIS — O09.519 SUPERVISION OF PRIMIGRAVIDA OF ADVANCED MATERNAL AGE, ANTEPARTUM: Primary | ICD-10-CM

## 2020-01-15 PROBLEM — O09.90 PREGNANCY, SUPERVISION FOR, HIGH-RISK: Status: RESOLVED | Noted: 2019-12-30 | Resolved: 2020-01-15

## 2020-01-15 PROCEDURE — G0463 HOSPITAL OUTPT CLINIC VISIT: HCPCS | Mod: 25,ZF

## 2020-01-15 PROCEDURE — 76819 FETAL BIOPHYS PROFIL W/O NST: CPT

## 2020-01-15 ASSESSMENT — MIFFLIN-ST. JEOR: SCORE: 1225.18

## 2020-01-15 ASSESSMENT — PAIN SCALES - GENERAL: PAINLEVEL: NO PAIN (0)

## 2020-01-15 ASSESSMENT — PATIENT HEALTH QUESTIONNAIRE - PHQ9: SUM OF ALL RESPONSES TO PHQ QUESTIONS 1-9: 0

## 2020-01-15 NOTE — LETTER
1/15/2020    RE: Shavonne Vogel  [address]  [facility] [address]     Dear Colleague,    Thank you for referring your patient, Shavonne Vogel, to the [facility] at [facility]. Please see a copy of my visit note below.    Subjective:     40 year old  at 37w4d presents for a routine prenatal appointment.  no vaginal bleeding,  leakage of fluid, or change in vaginal discharge.  occ  contractions.  Good  fetal movement.     No HA, visual changes, RUQ or epigastric pain.   Patient concerns: may decline IOL AMA  Discussed recommendations and close fetal surveillance if declines Feeling well overall.     Objective:  Vitals:    01/15/20 1323   BP: 92/66   Pulse: 79   Weight: 63.4 kg (139 lb 11.2 oz)   Height: 1.524 m (5')   See OB flowsheet    Assessment/Plan  PHQ-9 SCORE 2019 2020 1/15/2020   PHQ-9 Total Score 1 0 0     [unfilled]  GBS screening: Obtained.  Birth preferences reviewed: Un-Medicated, Water birth and :   Labor signs discussed. Reinforced daily fetal movement counts.  Reviewed why/how to contact provider if headache/visual changes/RUQ or epigastric pain, decreased fetal movement, vaginal bleeding, leakage of fluid.   Return to clinic in 1 week and prn if questions or concerns.     JA Daugherty CNM

## 2020-01-15 NOTE — PROGRESS NOTES
Subjective:      40 year old  at 37w4d presents for a routine prenatal appointment.         no vaginal bleeding,  leakage of fluid, or change in vaginal discharge.  occ  contractions.  Good  fetal movement.     No HA, visual changes, RUQ or epigastric pain.   Patient concerns: may decline IOL AMA  Discussed recommendations and close fetal surveillance if declines   Feeling well overall.   Objective:  Vitals:    01/15/20 1323   BP: 92/66   Pulse: 79   Weight: 63.4 kg (139 lb 11.2 oz)   Height: 1.524 m (5')    See OB flowsheet    Assessment/Plan     PHQ-9 SCORE 2019 2020 1/15/2020   PHQ-9 Total Score 1 0 0     [unfilled]  GBS screening: Obtained.  Birth preferences reviewed: Un-Medicated, Water birth and :   Labor signs discussed. Reinforced daily fetal movement counts.  Reviewed why/how to contact provider if headache/visual changes/RUQ or epigastric pain, decreased fetal movement, vaginal bleeding, leakage of fluid.   Return to clinic in 1 week and prn if questions or concerns.     JA Daugherty CNM

## 2020-01-22 ENCOUNTER — OFFICE VISIT (OUTPATIENT)
Dept: OBGYN | Facility: CLINIC | Age: 40
End: 2020-01-22
Attending: ADVANCED PRACTICE MIDWIFE
Payer: COMMERCIAL

## 2020-01-22 ENCOUNTER — ANCILLARY PROCEDURE (OUTPATIENT)
Dept: ULTRASOUND IMAGING | Facility: CLINIC | Age: 40
End: 2020-01-22
Attending: OBSTETRICS & GYNECOLOGY
Payer: COMMERCIAL

## 2020-01-22 VITALS
HEIGHT: 60 IN | BODY MASS INDEX: 27.74 KG/M2 | WEIGHT: 141.3 LBS | DIASTOLIC BLOOD PRESSURE: 75 MMHG | HEART RATE: 99 BPM | SYSTOLIC BLOOD PRESSURE: 111 MMHG

## 2020-01-22 DIAGNOSIS — O09.519 SUPERVISION OF PRIMIGRAVIDA OF ADVANCED MATERNAL AGE, ANTEPARTUM: Primary | ICD-10-CM

## 2020-01-22 DIAGNOSIS — O09.513 AMA (ADVANCED MATERNAL AGE) PRIMIGRAVIDA 35+, THIRD TRIMESTER: ICD-10-CM

## 2020-01-22 DIAGNOSIS — O09.519 SUPERVISION OF PRIMIGRAVIDA OF ADVANCED MATERNAL AGE, ANTEPARTUM: ICD-10-CM

## 2020-01-22 PROCEDURE — 76819 FETAL BIOPHYS PROFIL W/O NST: CPT

## 2020-01-22 PROCEDURE — G0463 HOSPITAL OUTPT CLINIC VISIT: HCPCS

## 2020-01-22 ASSESSMENT — MIFFLIN-ST. JEOR: SCORE: 1232.43

## 2020-01-22 NOTE — PROGRESS NOTES
Subjective:     40 year old  at 38w4d presents for routine prenatal visit.           Denies contractions, vaginal bleeding, discharge or leakage of fluid. Reports +fetal movement.  No HA, vision changes, ruq/epigastric pain.      Patient concerns: Feeling well overall. BPP done today d/t maternal age of 40. Result .  Pt prefers spontaneous labor- does not want to be induced.     Objective:  Vitals:    20 0946   BP: 111/75   BP Location: Left arm   Patient Position: Chair   Pulse: 99   Weight: 64.1 kg (141 lb 4.8 oz)   Height: 1.524 m (5')        See OB flowsheet    Assessment/Plan     Encounter Diagnosis   Name Primary?     Supervision of primigravida of advanced maternal age, WHS CNM Yes     Orders Placed This Encounter   Procedures     US OB Fetal Biophys Prf wo NonStrs Singls Sgl     - Reviewed why/how to contact provider if headache/visual changes/RUQ or epigastric pain, decreased fetal movement, vaginal bleeding, leakage of fluid or strong/regular contractions.   Patient education/orders or handouts today:  Sign/symptoms of labor, When to call for labor or other concerns, BPP, NST and Induction of labor    - Reviewed recommendation for induction of labor at 39 weeks d/t maternal age of 40. Discussed rationale and associated risks.  - Pt declines IOL at 39 weeks.  - Agreeable to  2x/week BPPs starting at 39 weeks. Will be 39weeks on Saturday. Prefers BPP on Friday vs presenting to  for NST Saturday.  - BPP scheduled for , , .     JA Alatorre, CNM

## 2020-01-22 NOTE — LETTER
2020       RE: Shavonne Vogel  [address]  [facility] [address]     Dear Colleague,    Thank you for referring your patient, Shavonne Vogel, to the [facility] at [facility]. Please see a copy of my visit note below.    Subjective:     40 year old  at 38w4d presents for routine prenatal visit.           Denies contractions, vaginal bleeding, discharge or leakage of fluid. Reports +fetal movement.  No HA, vision changes, ruq/epigastric pain.      Patient concerns: Feeling well overall. BPP done today d/t maternal age of 40. Result .  Pt prefers spontaneous labor- does not want to be induced.     Objective:  Vitals:    20 0946   BP: 111/75   BP Location: Left arm   Patient Position: Chair   Pulse: 99   Weight: 64.1 kg (141 lb 4.8 oz)   Height: 1.524 m (5')        See OB flowsheet    Assessment/Plan     Encounter Diagnosis   Name Primary?     Supervision of primigravida of advanced maternal age, [facility] CNM Yes     Orders Placed This Encounter   Procedures     US OB Fetal Biophys Prf wo NonStrs Singls Sgl     - Reviewed why/how to contact provider if headache/visual changes/RUQ or epigastric pain, decreased fetal movement, vaginal bleeding, leakage of fluid or strong/regular contractions.   Patient education/orders or handouts today:  Sign/symptoms of labor, When to call for labor or other concerns, BPP, NST and Induction of labor    - Reviewed recommendation for induction of labor at 39 weeks d/t maternal age of 40. Discussed rationale and associated risks.  - Pt declines IOL at 39 weeks.  - Agreeable to  2x/week BPPs starting at 39 weeks. Will be 39weeks on Saturday. Prefers BPP on Friday vs presenting to BP for NST Saturday.  - BPP scheduled for , , .     JA Alatorre, BRANDONM

## 2020-01-24 ENCOUNTER — OFFICE VISIT (OUTPATIENT)
Dept: OBGYN | Facility: CLINIC | Age: 40
End: 2020-01-24
Attending: ADVANCED PRACTICE MIDWIFE
Payer: COMMERCIAL

## 2020-01-24 ENCOUNTER — ANCILLARY PROCEDURE (OUTPATIENT)
Dept: ULTRASOUND IMAGING | Facility: CLINIC | Age: 40
End: 2020-01-24
Attending: OBSTETRICS & GYNECOLOGY
Payer: COMMERCIAL

## 2020-01-24 VITALS
HEART RATE: 87 BPM | SYSTOLIC BLOOD PRESSURE: 110 MMHG | BODY MASS INDEX: 27.62 KG/M2 | HEIGHT: 60 IN | DIASTOLIC BLOOD PRESSURE: 74 MMHG | WEIGHT: 140.7 LBS

## 2020-01-24 DIAGNOSIS — O09.519 SUPERVISION OF PRIMIGRAVIDA OF ADVANCED MATERNAL AGE, ANTEPARTUM: Primary | ICD-10-CM

## 2020-01-24 DIAGNOSIS — O09.513 AMA (ADVANCED MATERNAL AGE) PRIMIGRAVIDA 35+, THIRD TRIMESTER: ICD-10-CM

## 2020-01-24 DIAGNOSIS — N89.8 VAGINAL DISCHARGE: ICD-10-CM

## 2020-01-24 LAB
CLUE CELLS: NEGATIVE
TRICHOMONAS (WET PREP): NEGATIVE
YEAST (WET PREP): NEGATIVE

## 2020-01-24 PROCEDURE — G0463 HOSPITAL OUTPT CLINIC VISIT: HCPCS | Mod: 25,ZF

## 2020-01-24 PROCEDURE — 76819 FETAL BIOPHYS PROFIL W/O NST: CPT

## 2020-01-24 PROCEDURE — 87210 SMEAR WET MOUNT SALINE/INK: CPT | Mod: ZF | Performed by: ADVANCED PRACTICE MIDWIFE

## 2020-01-24 ASSESSMENT — PAIN SCALES - GENERAL: PAINLEVEL: NO PAIN (0)

## 2020-01-24 ASSESSMENT — MIFFLIN-ST. JEOR: SCORE: 1229.71

## 2020-01-24 NOTE — LETTER
2020       RE: Shavonne Vogel  [address]  [facility] [address]     Dear Colleague,    Thank you for referring your patient, Shavonne Vogel, to the [facility] at [facility]. Please see a copy of my visit note below.    Subjective:     40 year old  at 38w6d presents for routine prenatal visit.   Denies vaginal bleeding or leakage of fluid.  Report mild, intermittent cramping.  + fetal movement.       No HA, visual changes, RUQ or epigastric pain.   Patient concerns: Feeling well overall.   - Considering IOL next week before 40 weeks, agreeable to continue twice weekly BPPs until then   - BPP today , cephalic, normal VALERIE   - Discussed r/b/a of cervical exam/membrane sweep, will consider for Monday's visit   - Sent birth plan via [employer], reviewed preferences with patient. Discussed AMTSL    Objective:  Vitals:    20 1359   BP: 110/74   Pulse: 87   Weight: 63.8 kg (140 lb 11.2 oz)   Height: 1.524 m (5')   See OB flowsheet    Assessment/Plan:   Encounter Diagnoses   Name Primary?     Supervision of primigravida of advanced maternal age, [facility] CNM Yes     Vaginal discharge      Orders Placed This Encounter   Procedures     Wet Prep POCT     - Reviewed why/how to contact provider if headache/visual changes/RUQ or epigastric pain, decreased fetal movement, vaginal bleeding, leakage of fluid or strong/regular contractions.  - Patient education/orders or handouts today: Sign/symptoms of labor, When to call for labor or other concerns, BPP and Induction of labor   - Reviewed birth plan ([employer]) and discussed AMTSL. Pt declines unless increased bleeding.   - Return to clinic on Monday as scheduled and prn if questions or concerns.     JA Bashir CNM

## 2020-01-24 NOTE — PROGRESS NOTES
Subjective:     40 year old  at 38w6d presents for routine prenatal visit.   Denies vaginal bleeding or leakage of fluid.  Report mild, intermittent cramping.  + fetal movement.       No HA, visual changes, RUQ or epigastric pain.   Patient concerns: Feeling well overall.   - Considering IOL next week before 40 weeks, agreeable to continue twice weekly BPPs until then   - BPP today , cephalic, normal VALERIE   - Discussed r/b/a of cervical exam/membrane sweep, will consider for Monday's visit   - Sent birth plan via [employer], reviewed preferences with patient. Discussed AMTSL    Objective:  Vitals:    20 1359   BP: 110/74   Pulse: 87   Weight: 63.8 kg (140 lb 11.2 oz)   Height: 1.524 m (5')   See OB flowsheet    Assessment/Plan:   Encounter Diagnoses   Name Primary?     Supervision of primigravida of advanced maternal age, [facility] CNM Yes     Vaginal discharge      Orders Placed This Encounter   Procedures     Wet Prep POCT     - Reviewed why/how to contact provider if headache/visual changes/RUQ or epigastric pain, decreased fetal movement, vaginal bleeding, leakage of fluid or strong/regular contractions.  - Patient education/orders or handouts today: Sign/symptoms of labor, When to call for labor or other concerns, BPP and Induction of labor   - Reviewed birth plan ([employer]) and discussed AMTSL. Pt declines unless increased bleeding.   - Return to clinic on Monday as scheduled and prn if questions or concerns.     JA Bashir CNM

## 2020-01-27 ENCOUNTER — OFFICE VISIT (OUTPATIENT)
Dept: OBGYN | Facility: CLINIC | Age: 40
End: 2020-01-27
Attending: ADVANCED PRACTICE MIDWIFE
Payer: COMMERCIAL

## 2020-01-27 ENCOUNTER — HOSPITAL ENCOUNTER (OUTPATIENT)
Dept: ULTRASOUND IMAGING | Facility: CLINIC | Age: 40
Discharge: HOME | End: 2020-01-27
Attending: ADVANCED PRACTICE MIDWIFE | Admitting: ADVANCED PRACTICE MIDWIFE
Payer: COMMERCIAL

## 2020-01-27 VITALS
BODY MASS INDEX: 27.78 KG/M2 | DIASTOLIC BLOOD PRESSURE: 80 MMHG | SYSTOLIC BLOOD PRESSURE: 113 MMHG | HEIGHT: 60 IN | WEIGHT: 141.5 LBS | HEART RATE: 85 BPM

## 2020-01-27 DIAGNOSIS — F98.8 ADD (ATTENTION DEFICIT DISORDER) WITHOUT HYPERACTIVITY: ICD-10-CM

## 2020-01-27 DIAGNOSIS — O09.519 SUPERVISION OF PRIMIGRAVIDA OF ADVANCED MATERNAL AGE, ANTEPARTUM: ICD-10-CM

## 2020-01-27 DIAGNOSIS — O09.519 SUPERVISION OF PRIMIGRAVIDA OF ADVANCED MATERNAL AGE, ANTEPARTUM: Primary | ICD-10-CM

## 2020-01-27 PROCEDURE — 76819 FETAL BIOPHYS PROFIL W/O NST: CPT

## 2020-01-27 PROCEDURE — G0463 HOSPITAL OUTPT CLINIC VISIT: HCPCS

## 2020-01-27 ASSESSMENT — MIFFLIN-ST. JEOR: SCORE: 1233.34

## 2020-01-27 ASSESSMENT — PAIN SCALES - GENERAL: PAINLEVEL: NO PAIN (0)

## 2020-01-27 NOTE — LETTER
2020       RE: Shavonne Vogel  [address]  [facility] [address]     Dear Colleague,    Thank you for referring your patient, Shavonne Vogel, to the [facility] at [facility]. Please see a copy of my visit note below.    Subjective:     40 year old  at 39w2d presents for routine prenatal visit.    No vaginal bleeding or leakage of fluid.  some contractions.  pos fetal movement.       No HA, visual changes, RUQ or epigastric pain.   Patient concerns:   Feeling well overall. BPP today , nml VALERIE    Objective:  Vitals:    20 0940   BP: 113/80   Pulse: 85   Weight: 64.2 kg (141 lb 8 oz)   Height: 1.524 m (5')   See OB flowsheet    Assessment/Plan  Encounter Diagnoses   Name Primary?     Supervision of primigravida of advanced maternal age, S CNM Yes     ADD (attention deficit disorder) without hyperactivity      No orders of the defined types were placed in this encounter.    No orders of the defined types were placed in this encounter.    Has BPP on Wed as well, will schedule to see CNM to repeat cx/membrane stripping  - Reviewed why/how to contact provider if headache/visual changes/RUQ or epigastric pain, decreased fetal movement, vaginal bleeding, leakage of fluid or strong/regular contractions.   Patient education/orders or handouts today:  Sign/symptoms of labor, When to call for labor or other concerns and Induction of labor   Considering IOL on Thursday or Friday, will discuss on Wed  Understands our first recommendation is 39 for IOL, she can call anytime should she change her mind and want IOL  Return to clinic in 1 week and prn if questions or concerns.     Chary Kessler, APRN CNM

## 2020-01-27 NOTE — PROGRESS NOTES
Subjective:     40 year old  at 39w2d presents for routine prenatal visit.            no vaginal bleeding or leakage of fluid.  some contractions.  pos fetal movement.        No HA, visual changes, RUQ or epigastric pain.   Patient concerns:   Feeling well overall. BPP today , nml VALERIE  Objective:  Vitals:    20 0940   BP: 113/80   Pulse: 85   Weight: 64.2 kg (141 lb 8 oz)   Height: 1.524 m (5')    See OB flowsheet  Assessment/Plan     Encounter Diagnoses   Name Primary?     Supervision of primigravida of advanced maternal age, S CNM Yes     ADD (attention deficit disorder) without hyperactivity      No orders of the defined types were placed in this encounter.    No orders of the defined types were placed in this encounter.    Has BPP on Wed as well, will schedule to see CNM to repeat cx/membrane stripping  - Reviewed why/how to contact provider if headache/visual changes/RUQ or epigastric pain, decreased fetal movement, vaginal bleeding, leakage of fluid or strong/regular contractions.   Patient education/orders or handouts today:  Sign/symptoms of labor, When to call for labor or other concerns and Induction of labor   Considering IOL on Thursday or Friday, will discuss on Wed  Understands our first recommendation is 39 for IOL, she can call anytime should she change her mind and want IOL  Return to clinic in 1 week and prn if questions or concerns.   JA Quintana CNM

## 2020-01-29 ENCOUNTER — ANCILLARY PROCEDURE (OUTPATIENT)
Dept: ULTRASOUND IMAGING | Facility: CLINIC | Age: 40
End: 2020-01-29
Attending: OBSTETRICS & GYNECOLOGY
Payer: COMMERCIAL

## 2020-01-29 ENCOUNTER — OFFICE VISIT (OUTPATIENT)
Dept: OBGYN | Facility: CLINIC | Age: 40
End: 2020-01-29
Attending: OBSTETRICS & GYNECOLOGY
Payer: COMMERCIAL

## 2020-01-29 VITALS
HEIGHT: 60 IN | HEART RATE: 92 BPM | SYSTOLIC BLOOD PRESSURE: 104 MMHG | BODY MASS INDEX: 28.03 KG/M2 | DIASTOLIC BLOOD PRESSURE: 72 MMHG | WEIGHT: 142.8 LBS

## 2020-01-29 DIAGNOSIS — O09.519 SUPERVISION OF PRIMIGRAVIDA OF ADVANCED MATERNAL AGE, ANTEPARTUM: Primary | ICD-10-CM

## 2020-01-29 DIAGNOSIS — O09.513 AMA (ADVANCED MATERNAL AGE) PRIMIGRAVIDA 35+, THIRD TRIMESTER: ICD-10-CM

## 2020-01-29 PROCEDURE — 76819 FETAL BIOPHYS PROFIL W/O NST: CPT

## 2020-01-29 ASSESSMENT — MIFFLIN-ST. JEOR: SCORE: 1239.24

## 2020-01-29 NOTE — PROGRESS NOTES
[facility]  Prenatal Visit    SUBJECTIVE  No contractions, no vaginal bleeding, no leakage of fluid, normal fetal movement. No headache, no vision changes, no RUQ pain.    OBJECTIVE  /72   Pulse 92   Ht 1.524 m (5')   Wt 64.8 kg (142 lb 12.8 oz)   LMP 2019   BMI 27.89 kg/m      See OB Flowsheet  SVE 3+/50/-2, vtx, soft consistency, mid position. I performed a membrane sweep at Shavonne's request.    ASSESSMENT/PLAN  Shavonne Vogel is a 40 year old  who is 39w4d, here for SANDY.    I reviewed with Shavonne my recommendation for delivery by GARRET for patients > 35, and that induction is likely to be successful given her favorable cervix. Shavonne was in agreement for IOL on Friday,  at 16:00, scheduled after her next PNC with the CNMs. GBS neg.    Yissel Singh MD, MSCI    [facility]/OBGYN

## 2020-01-29 NOTE — LETTER
2020       RE: Shavonne Vogel  [address]  [facility] [address]     Dear Colleague,    Thank you for referring your patient, Shavonne Vogel, [facility] Please see a copy of my visit note below.    [facility]  Prenatal Visit    SUBJECTIVE  No contractions, no vaginal bleeding, no leakage of fluid, normal fetal movement. No headache, no vision changes, no RUQ pain.    OBJECTIVE  /72   Pulse 92   Ht 1.524 m (5')   Wt 64.8 kg (142 lb 12.8 oz)   LMP 2019   BMI 27.89 kg/m       See OB Flowsheet  SVE 3+/50/-2, vtx, soft consistency, mid position. I performed a membrane sweep at Shavonne's request.    ASSESSMENT/PLAN  Shavonne Vogel is a 40 year old  who is 39w4d, here for SANDY.    I reviewed with Shavonne my recommendation for delivery by GARRET for patients > 35, and that induction is likely to be successful given her favorable cervix. Shavonne was in agreement for IOL on Friday,  at 16:00, scheduled after her next PNC with the CNMs. GBS neg.    Yissel Singh MD, MSCI    [facility]/OB[facility]

## 2020-01-31 ENCOUNTER — OFFICE VISIT (OUTPATIENT)
Dept: OBGYN | Facility: CLINIC | Age: 40
End: 2020-01-31
Attending: ADVANCED PRACTICE MIDWIFE
Payer: COMMERCIAL

## 2020-01-31 ENCOUNTER — ANCILLARY PROCEDURE (OUTPATIENT)
Dept: ULTRASOUND IMAGING | Facility: CLINIC | Age: 40
End: 2020-01-31
Attending: ADVANCED PRACTICE MIDWIFE
Payer: COMMERCIAL

## 2020-01-31 VITALS
BODY MASS INDEX: 28.07 KG/M2 | SYSTOLIC BLOOD PRESSURE: 107 MMHG | HEIGHT: 60 IN | DIASTOLIC BLOOD PRESSURE: 72 MMHG | WEIGHT: 143 LBS | HEART RATE: 85 BPM

## 2020-01-31 DIAGNOSIS — O09.519 SUPERVISION OF PRIMIGRAVIDA OF ADVANCED MATERNAL AGE, ANTEPARTUM: Primary | ICD-10-CM

## 2020-01-31 PROCEDURE — 76819 FETAL BIOPHYS PROFIL W/O NST: CPT

## 2020-01-31 PROCEDURE — G0463 HOSPITAL OUTPT CLINIC VISIT: HCPCS | Mod: ZF

## 2020-01-31 ASSESSMENT — PAIN SCALES - GENERAL: PAINLEVEL: NO PAIN (0)

## 2020-01-31 ASSESSMENT — MIFFLIN-ST. JEOR: SCORE: 1240.14

## 2020-01-31 NOTE — PROGRESS NOTES
Subjective:     40 year old  at 39w6d presents for routine prenatal visit.         Denies vaginal bleeding or leakage of fluid.  Denies contractions.  Endorses fetal movement.        No HA, visual changes, RUQ or epigastric pain.   Patient concerns: Feels strongly that she would like to wait on IOL due to encouraging BPPs and feeling generally well. Would like another membrane sweep today. Pt feeling nervous about use of pitocin.   Feeling well overall.    Objective:  Vitals:    20 1405   BP: 107/72   Pulse: 85   Weight: 64.9 kg (143 lb)   Height: 1.524 m (5')   See OB flowsheet    Cervix 3/60/-2 membranes swept per pt request.   BPP , VALERIE normal, cephalic    Assessment/Plan     Encounter Diagnosis   Name Primary?     Supervision of primigravida of advanced maternal age, S CNM Yes     - Reviewed postdates testing including BPP => 41 weeks and rationale for induction of labor based on results.   Patient desires a BPP/SANDY on Monday the  and Wed the , will do IOL on Thursday if still not in labor.   - Reviewed use of pitocin for IOL/augmentation. Answered questions to pt's satisfaction.  - Reviewed why/how to contact provider if headache/visual changes/RUQ or epigastric pain, decreased fetal movement, vaginal bleeding, leakage of fluid or strong/regular contractions.   Patient education/orders or handouts today:  When to call for labor or other concerns, Postdates testing discussion, BPP and Induction of labor  Return to clinic in 1 week and prn if questions or concerns.   Please schedule IOL for Thursday if still pregnant.     Stacie Ladd CNM

## 2020-01-31 NOTE — LETTER
2020       RE: Shavonne Vogel  [address]  [facility] [address]     Dear Colleague,    Thank you for referring your patient, Shavonne Vogel, to the [facility] at [facility]. Please see a copy of my visit note below.    Subjective:     40 year old  at 39w6d presents for routine prenatal visit.         Denies vaginal bleeding or leakage of fluid.  Denies contractions.  Endorses fetal movement.        No HA, visual changes, RUQ or epigastric pain.   Patient concerns: Feels strongly that she would like to wait on IOL due to encouraging BPPs and feeling generally well. Would like another membrane sweep today. Pt feeling nervous about use of pitocin.   Feeling well overall.    Objective:  Vitals:    20 1405   BP: 107/72   Pulse: 85   Weight: 64.9 kg (143 lb)   Height: 1.524 m (5')   See OB flowsheet    Cervix 3/60/-2 membranes swept per pt request.   BPP 8/8, VALERIE normal, cephalic    Assessment/Plan  Encounter Diagnosis   Name Primary?     Supervision of primigravida of advanced maternal age, [facility] CNM Yes     - Reviewed postdates testing including BPP => 41 weeks and rationale for induction of labor based on results.   Patient desires a BPP/SANDY on Monday the  and Wed the , will do IOL on Thursday if still not in labor.   - Reviewed use of pitocin for IOL/augmentation. Answered questions to pt's satisfaction.  - Reviewed why/how to contact provider if headache/visual changes/RUQ or epigastric pain, decreased fetal movement, vaginal bleeding, leakage of fluid or strong/regular contractions.   Patient education/orders or handouts today:  When to call for labor or other concerns, Postdates testing discussion, BPP and Induction of labor  Return to clinic in 1 week and prn if questions or concerns.   Please schedule IOL for Thursday if still pregnant.     Stacie Ladd CNM

## 2020-02-03 ENCOUNTER — HOSPITAL ENCOUNTER (INPATIENT)
Facility: CLINIC | Age: 40
LOS: 4 days | Discharge: HOME HEALTH | End: 2020-02-07
Attending: MIDWIFE | Admitting: ADVANCED PRACTICE MIDWIFE
Payer: COMMERCIAL

## 2020-02-03 ENCOUNTER — OFFICE VISIT (OUTPATIENT)
Dept: OBGYN | Facility: CLINIC | Age: 40
End: 2020-02-03
Attending: ADVANCED PRACTICE MIDWIFE
Payer: COMMERCIAL

## 2020-02-03 ENCOUNTER — ANCILLARY PROCEDURE (OUTPATIENT)
Dept: ULTRASOUND IMAGING | Facility: CLINIC | Age: 40
End: 2020-02-03
Attending: ADVANCED PRACTICE MIDWIFE
Payer: COMMERCIAL

## 2020-02-03 VITALS
HEART RATE: 86 BPM | HEIGHT: 60 IN | BODY MASS INDEX: 27.8 KG/M2 | WEIGHT: 141.6 LBS | DIASTOLIC BLOOD PRESSURE: 73 MMHG | SYSTOLIC BLOOD PRESSURE: 99 MMHG

## 2020-02-03 DIAGNOSIS — O09.93 HRP (HIGH RISK PREGNANCY), THIRD TRIMESTER: Primary | ICD-10-CM

## 2020-02-03 DIAGNOSIS — D62 ANEMIA DUE TO BLOOD LOSS, ACUTE: ICD-10-CM

## 2020-02-03 DIAGNOSIS — R39.15 URINARY URGENCY: ICD-10-CM

## 2020-02-03 DIAGNOSIS — Z98.891 S/P C-SECTION: Primary | ICD-10-CM

## 2020-02-03 PROBLEM — Z36.89 ENCOUNTER FOR TRIAGE IN PREGNANT PATIENT: Status: ACTIVE | Noted: 2020-02-03

## 2020-02-03 LAB
ABO + RH BLD: NORMAL
ABO + RH BLD: NORMAL
ALBUMIN UR-MCNC: NEGATIVE MG/DL
APPEARANCE UR: CLEAR
BASOPHILS # BLD AUTO: 0 10E9/L (ref 0–0.2)
BASOPHILS NFR BLD AUTO: 0.1 %
BILIRUB UR QL STRIP: NEGATIVE
BLD GP AB SCN SERPL QL: NORMAL
BLOOD BANK CMNT PATIENT-IMP: NORMAL
COLOR UR AUTO: YELLOW
DIFFERENTIAL METHOD BLD: NORMAL
EOSINOPHIL # BLD AUTO: 0.1 10E9/L (ref 0–0.7)
EOSINOPHIL NFR BLD AUTO: 0.7 %
ERYTHROCYTE [DISTWIDTH] IN BLOOD BY AUTOMATED COUNT: 13.7 % (ref 10–15)
GLUCOSE UR STRIP-MCNC: NEGATIVE MG/DL
HCT VFR BLD AUTO: 36.9 % (ref 35–47)
HGB BLD-MCNC: 12.8 G/DL (ref 11.7–15.7)
HGB UR QL STRIP: NEGATIVE
IMM GRANULOCYTES # BLD: 0.2 10E9/L (ref 0–0.4)
IMM GRANULOCYTES NFR BLD: 1.9 %
KETONES UR STRIP-MCNC: NEGATIVE MG/DL
LEUKOCYTE ESTERASE UR QL STRIP: NEGATIVE
LYMPHOCYTES # BLD AUTO: 1.4 10E9/L (ref 0.8–5.3)
LYMPHOCYTES NFR BLD AUTO: 16.2 %
MCH RBC QN AUTO: 32 PG (ref 26.5–33)
MCHC RBC AUTO-ENTMCNC: 34.7 G/DL (ref 31.5–36.5)
MCV RBC AUTO: 92 FL (ref 78–100)
MONOCYTES # BLD AUTO: 0.7 10E9/L (ref 0–1.3)
MONOCYTES NFR BLD AUTO: 8.4 %
NEUTROPHILS # BLD AUTO: 6.4 10E9/L (ref 1.6–8.3)
NEUTROPHILS NFR BLD AUTO: 72.7 %
NITRATE UR QL: NEGATIVE
NRBC # BLD AUTO: 0 10*3/UL
NRBC BLD AUTO-RTO: 0 /100
PH UR STRIP: 7 PH (ref 5–7)
PLATELET # BLD AUTO: 250 10E9/L (ref 150–450)
RBC # BLD AUTO: 4 10E12/L (ref 3.8–5.2)
RUPTURE OF FETAL MEMBRANES BY ROM PLUS: POSITIVE
SP GR UR STRIP: 1.01 (ref 1–1.03)
SPECIMEN EXP DATE BLD: NORMAL
UROBILINOGEN UR STRIP-ACNC: 0.2 EU/DL (ref 0.2–1)
WBC # BLD AUTO: 8.8 10E9/L (ref 4–11)

## 2020-02-03 PROCEDURE — 12000001 ZZH R&B MED SURG/OB UMMC

## 2020-02-03 PROCEDURE — 86901 BLOOD TYPING SEROLOGIC RH(D): CPT | Performed by: ADVANCED PRACTICE MIDWIFE

## 2020-02-03 PROCEDURE — 59025 FETAL NON-STRESS TEST: CPT

## 2020-02-03 PROCEDURE — 85025 COMPLETE CBC W/AUTO DIFF WBC: CPT | Performed by: ADVANCED PRACTICE MIDWIFE

## 2020-02-03 PROCEDURE — 87086 URINE CULTURE/COLONY COUNT: CPT | Performed by: ADVANCED PRACTICE MIDWIFE

## 2020-02-03 PROCEDURE — 81003 URINALYSIS AUTO W/O SCOPE: CPT

## 2020-02-03 PROCEDURE — 84112 EVAL AMNIOTIC FLUID PROTEIN: CPT | Performed by: ADVANCED PRACTICE MIDWIFE

## 2020-02-03 PROCEDURE — G0463 HOSPITAL OUTPT CLINIC VISIT: HCPCS | Mod: 25

## 2020-02-03 PROCEDURE — 86900 BLOOD TYPING SEROLOGIC ABO: CPT | Performed by: ADVANCED PRACTICE MIDWIFE

## 2020-02-03 PROCEDURE — 25000125 ZZHC RX 250: Performed by: ADVANCED PRACTICE MIDWIFE

## 2020-02-03 PROCEDURE — 25800030 ZZH RX IP 258 OP 636: Performed by: ADVANCED PRACTICE MIDWIFE

## 2020-02-03 PROCEDURE — 86780 TREPONEMA PALLIDUM: CPT | Performed by: ADVANCED PRACTICE MIDWIFE

## 2020-02-03 PROCEDURE — 76819 FETAL BIOPHYS PROFIL W/O NST: CPT

## 2020-02-03 PROCEDURE — 86850 RBC ANTIBODY SCREEN: CPT | Performed by: ADVANCED PRACTICE MIDWIFE

## 2020-02-03 PROCEDURE — 36415 COLL VENOUS BLD VENIPUNCTURE: CPT | Performed by: ADVANCED PRACTICE MIDWIFE

## 2020-02-03 PROCEDURE — G0463 HOSPITAL OUTPT CLINIC VISIT: HCPCS | Mod: ZF

## 2020-02-03 RX ORDER — ONDANSETRON 2 MG/ML
4 INJECTION INTRAMUSCULAR; INTRAVENOUS EVERY 6 HOURS PRN
Status: DISCONTINUED | OUTPATIENT
Start: 2020-02-03 | End: 2020-02-04

## 2020-02-03 RX ORDER — OXYTOCIN 10 [USP'U]/ML
10 INJECTION, SOLUTION INTRAMUSCULAR; INTRAVENOUS
Status: DISCONTINUED | OUTPATIENT
Start: 2020-02-03 | End: 2020-02-04

## 2020-02-03 RX ORDER — OXYTOCIN 10 [USP'U]/ML
INJECTION, SOLUTION INTRAMUSCULAR; INTRAVENOUS
Status: DISCONTINUED
Start: 2020-02-03 | End: 2020-02-04 | Stop reason: HOSPADM

## 2020-02-03 RX ORDER — METHYLERGONOVINE MALEATE 0.2 MG/ML
200 INJECTION INTRAVENOUS
Status: DISCONTINUED | OUTPATIENT
Start: 2020-02-03 | End: 2020-02-04

## 2020-02-03 RX ORDER — OXYTOCIN/0.9 % SODIUM CHLORIDE 30/500 ML
100-340 PLASTIC BAG, INJECTION (ML) INTRAVENOUS CONTINUOUS PRN
Status: COMPLETED | OUTPATIENT
Start: 2020-02-03 | End: 2020-02-04

## 2020-02-03 RX ORDER — FENTANYL CITRATE 50 UG/ML
50-100 INJECTION, SOLUTION INTRAMUSCULAR; INTRAVENOUS
Status: DISCONTINUED | OUTPATIENT
Start: 2020-02-03 | End: 2020-02-04

## 2020-02-03 RX ORDER — LIDOCAINE 40 MG/G
CREAM TOPICAL
Status: DISCONTINUED | OUTPATIENT
Start: 2020-02-03 | End: 2020-02-04

## 2020-02-03 RX ORDER — NALOXONE HYDROCHLORIDE 0.4 MG/ML
.1-.4 INJECTION, SOLUTION INTRAMUSCULAR; INTRAVENOUS; SUBCUTANEOUS
Status: DISCONTINUED | OUTPATIENT
Start: 2020-02-03 | End: 2020-02-04

## 2020-02-03 RX ORDER — OXYTOCIN/0.9 % SODIUM CHLORIDE 30/500 ML
PLASTIC BAG, INJECTION (ML) INTRAVENOUS
Status: DISCONTINUED
Start: 2020-02-03 | End: 2020-02-04 | Stop reason: HOSPADM

## 2020-02-03 RX ORDER — MISOPROSTOL 200 UG/1
TABLET ORAL
Status: DISCONTINUED
Start: 2020-02-03 | End: 2020-02-04 | Stop reason: HOSPADM

## 2020-02-03 RX ORDER — OXYCODONE AND ACETAMINOPHEN 5; 325 MG/1; MG/1
1 TABLET ORAL
Status: DISCONTINUED | OUTPATIENT
Start: 2020-02-03 | End: 2020-02-04

## 2020-02-03 RX ORDER — LIDOCAINE HYDROCHLORIDE 10 MG/ML
INJECTION, SOLUTION EPIDURAL; INFILTRATION; INTRACAUDAL; PERINEURAL
Status: DISCONTINUED
Start: 2020-02-03 | End: 2020-02-04 | Stop reason: HOSPADM

## 2020-02-03 RX ORDER — OXYTOCIN/0.9 % SODIUM CHLORIDE 30/500 ML
1-24 PLASTIC BAG, INJECTION (ML) INTRAVENOUS CONTINUOUS
Status: DISCONTINUED | OUTPATIENT
Start: 2020-02-03 | End: 2020-02-04

## 2020-02-03 RX ORDER — CARBOPROST TROMETHAMINE 250 UG/ML
250 INJECTION, SOLUTION INTRAMUSCULAR
Status: DISCONTINUED | OUTPATIENT
Start: 2020-02-03 | End: 2020-02-04

## 2020-02-03 RX ORDER — SODIUM CHLORIDE, SODIUM LACTATE, POTASSIUM CHLORIDE, CALCIUM CHLORIDE 600; 310; 30; 20 MG/100ML; MG/100ML; MG/100ML; MG/100ML
INJECTION, SOLUTION INTRAVENOUS CONTINUOUS
Status: DISCONTINUED | OUTPATIENT
Start: 2020-02-03 | End: 2020-02-04

## 2020-02-03 RX ORDER — IBUPROFEN 800 MG/1
800 TABLET, FILM COATED ORAL
Status: DISCONTINUED | OUTPATIENT
Start: 2020-02-03 | End: 2020-02-04

## 2020-02-03 RX ORDER — ACETAMINOPHEN 325 MG/1
650 TABLET ORAL EVERY 4 HOURS PRN
Status: DISCONTINUED | OUTPATIENT
Start: 2020-02-03 | End: 2020-02-04

## 2020-02-03 RX ADMIN — SODIUM CHLORIDE, POTASSIUM CHLORIDE, SODIUM LACTATE AND CALCIUM CHLORIDE: 600; 310; 30; 20 INJECTION, SOLUTION INTRAVENOUS at 22:50

## 2020-02-03 RX ADMIN — Medication 2 MILLI-UNITS/MIN: at 22:54

## 2020-02-03 ASSESSMENT — MIFFLIN-ST. JEOR: SCORE: 1233.79

## 2020-02-03 NOTE — LETTER
"2/3/2020       RE: Shavonne Vogel  [address]  [facility] [address]     Dear Colleague,    Thank you for referring your patient, Shavonne Vogel, to the [facility] at [facility]. Please see a copy of my visit note below.    Subjective:     40 year old  at 40w2d presents for routine prenatal visit. BPP completed today for AMA.   but MVP 2.2cm down a significant amount from last week.  Patient reports some urinary urgency, UA negative. UC to be sent.  Patient desires membrane sweep today.  R/B discussed.  Membrane sweeping performed at patient request.  After exam, moderate amount of pink watery discharge noted.  Patient then reports that she has been noticing some change in discharge, \"feeling like I peed a little\" since last evening.  Will send to L&D for evaluation, on-call CNM and charge RN notified.             Objective:  Vitals:    20 1045   BP: 99/73   BP Location: Left arm   Patient Position: Chair   Pulse: 86   Weight: 64.2 kg (141 lb 9.6 oz)   Height: 1.524 m (5')   See OB flowsheet    Assessment/Plan  Encounter Diagnoses   Name Primary?     HRP (high risk pregnancy), third trimester Yes     Urinary urgency      Orders Placed This Encounter   Procedures     Clinitek Urine Macroscopic Nursing POCT     Clinitek Urine Macroscopic POCT     JA Amaya CNM        "

## 2020-02-03 NOTE — PROGRESS NOTES
Vital signs:  Temp: 98  F (36.7  C) Temp src: Oral BP: 112/73 Pulse: 74   Resp: 16            Estimated body mass index is 27.65 kg/m  as calculated from the following:    Height as of an earlier encounter on 2/3/20: 1.524 m (5').    Weight as of an earlier encounter on 2/3/20: 64.2 kg (141 lb 9.6 oz).    1430: Shavonne requested acupressure to support labor s/p SROM.  Acupressure was performed on Bladder 60 and Bladder 67 pressure points by SNM.    I, SARAHI Iglesias, am serving as a scribe to document services personally performed by CNM based on the provider's statements to me.- SARAHI Iglesias  ATTESTATION: The encounter was performed by me and scribed by the SNM. The scribed note accurately reflects my personal services and decisions made by me.  JA Escamilla CNM

## 2020-02-03 NOTE — PROGRESS NOTES
"Subjective:     40 year old  at 40w2d presents for routine prenatal visit. BPP completed today for AMA.   but MVP 2.2cm down a significant amount from last week.  Patient reports some urinary urgency, UA negative.  UC to be sent.  Patient desires membrane sweep today.  R/B discussed.  Membrane sweeping performed at patient request.  After exam, moderate amount of pink watery discharge noted.  Patient then reports that she has been noticing some change in discharge, \"feeling like I peed a little\" since last evening.  Will send to L&D for evaluation, on-call CNM and charge RN notified.               Objective:  Vitals:    20 1045   BP: 99/73   BP Location: Left arm   Patient Position: Chair   Pulse: 86   Weight: 64.2 kg (141 lb 9.6 oz)   Height: 1.524 m (5')    See OB flowsheet  Assessment/Plan     Encounter Diagnoses   Name Primary?     HRP (high risk pregnancy), third trimester Yes     Urinary urgency      Orders Placed This Encounter   Procedures     Clinitek Urine Macroscopic Nursing POCT     Clinitek Urine Macroscopic POCT       JA Amaya CNM    "

## 2020-02-03 NOTE — PATIENT INSTRUCTIONS
Induction of labor scheduled for Thursday February 6, 2020 at 9:00am.  Please call labor and delivery around 8:00am to check in, [phone].

## 2020-02-03 NOTE — NURSING NOTE
Chief Complaint   Patient presents with     Prenatal Care     40w2d       See CONNER Singh 2/3/2020

## 2020-02-03 NOTE — H&P
"ADMIT NOTE  =================  40w2d    Shavonne Vogel is a 40 year old female with an Patient's last menstrual period was 2019. and Estimated Date of Delivery: 2020 is [facility] the [facility] on 2/3/2020 at 12:44 PM with SROM x 4.75 hours.     HPI  ================  Shavonne Vogel presented to [facility] clinic for routine prenatal appointment this morning.  Upon membrane sweep, watery pink tinged fluid was noticed by CNM. Pt was sent to  for confirmation of SROM. Pt reports she noticed leakage first this morning at 0800. She denies any headache, vision changes, vaginal bleeding. Reports baby has been active.   Pt AMA and has been having BPPs. Last today with cephalic presentation and BPP .    Contractions- not felt by patient, TOCO tracing q 3-5 min  Fetal movement- active  ROM- yes, small, clear and pink-tinged   Vaginal bleeding- none  GBS- negative  FOB- is involved, Ahsan  Other labor support-  Mary to join eventually    Weight gain- 141 - 111 lbs, Total weight gain- 30 lbs  Height- 60\"  BMI- 21  First prenatal visit at 8 weeks @ [facility], transferred to [facility] for 3 visits, transferred to [facility] @ 29 weeks for 8 visits, Total visits- 17    PROBLEM LIST  =================  Patient Active Problem List    Diagnosis Date Noted     Encounter for triage in pregnant patient 2020     Priority: Medium     Supervision of primigravida of advanced maternal age, [facility] CNM 2019     Priority: Medium     Transfer from [facility]    Dating by: LMP confirmed with US at 8 weeks  Screening:  NIPT WNL, Fragile X WNL, SMA WNL, AFP WNL, Preparent Carrier Screen WNL  Rh status: AB Rh Positive      Previous labs: Rh pos, Rubella immune, GCT, CBC, RPR wnl; Hep B/C, HIV non-reactive. Platelets 231.  - s/p flu shot, Tdap  - pap NILM in 3/2019, HPV neg, Hx LEEP  - Traveled to [facility] 2019. Protected herself from mosquito bites.  No symptoms of Zika and no testing.       Myopia " 2016     Priority: Medium     History of abnormal cervical Pap smear 2015     Priority: Medium     in [place]. 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 s/p colp and cryo.   May 2015 nl pap  2019  NILM, neg HPV    39 y.o.  Plan:  Cotesting 3/2024       ADD (attention deficit disorder) without hyperactivity 2015     Priority: Medium       HISTORIES  ============  No Known Allergies  Past Medical History:   Diagnosis Date     Abnormal Pap smear of cervix      Hx of colposcopy with cervical biopsy      Past Surgical History:   Procedure Laterality Date     NO HISTORY OF SURGERY     .  History reviewed. No pertinent family history.  Social History     Tobacco Use     Smoking status: Never Smoker     Smokeless tobacco: Never Used   Substance Use Topics     Alcohol use: Never     Frequency: Never     OB History    Para Term  AB Living   1 0 0 0 0 0   SAB TAB Ectopic Multiple Live Births   0 0 0 0 0      # Outcome Date GA Lbr Octavio/2nd Weight Sex Delivery Anes PTL Lv   1 Current                 LABS:   ===========  Prenatal Labs: per scanned records-   CBC WNL, hgb 14.4;  RPR/ HBsAg/ HIV/ HepC non-reactive; GC/Ch/UA negative  2019 Clinic [facility]: GCT WNL (94),  S/p flu/Tdap    Rhogam not indicated   Lab Results   Component Value Date    HGB 12.1 2020     Rubella immune  Lab Results   Component Value Date    GBS Negative 2020     Other labs:  Results for orders placed or performed during the hospital encounter of 20 (from the past 24 hour(s))   Rupture of Fetal Membranes by ROM Plus   Result Value Ref Range    Rupture of Fetal Membranes by ROM Plus Positive (A) NEG^Negative       ROS  =========  Pt denies significant respiratory, cardiovacular, GI, or muscular/skeletalcomplaints.    See RN data base ROS.       PHYSICAL EXAM:  ===============  LMP 2019      General appearance: comfortable  GENERAL APPEARANCE: healthy, alert and no distress  RESP: lungs clear to  auscultation - no rales, rhonchi or wheezes  CV: regular rates and rhythm, normal S1 S2, no S3 or S4 and no murmur,and no varicosities  ABDOMEN:  soft, nontender, no epigastric pain  NEURO: Denies headache, blurred vision, other vision changes  PSYCH: mentation appears normal. and affect normal/bright  Legs: Reflexes normal bilaterally     Abdomen: gravid, vertex fetus per Leopold's, non-tender between contractions.   Cephalic presentation confirmed by BSUS  EFW-  6.5 lbs.   CONTRACTIONS: not felt by patient and every 3-5 minutes  FETAL HEART TONES: continuous EFM- baseline 130 with moderate variability and positive accelerations. No decelerations.  PELVIC EXAM: 4.5/ 50%/ Mid/ average/ -1 (per clinic visit)  KNIGHT: 7  BLOODY SHOW: no   ROM:yes, small, clear and pink-tinged  FLUID: clear and pink tinged  ROMPLUS: positive    # Pain Assessment:  Current Pain Score 12/31/2019   Patient currently in pain? denies   Pain descriptors -   Shavonne butcher pain level was assessed and she currently denies pain.        ASSESSMENT:  ==============  IUP @ 40w2d admitted SROM x 4.75 hours   AMA- BPPs wnl  NST REACTIVE  Fetal Heart Rate - category one  GBS- negative  AMA    Patient Active Problem List   Diagnosis     History of abnormal cervical Pap smear     Supervision of primigravida of advanced maternal age, [facility] CNM     Myopia     ADD (attention deficit disorder) without hyperactivity     Encounter for triage in pregnant patient       PLAN:  ===========  Admit - see IP orders  Pain medication options reviewed. Pt is interested in un-medicated birth. States she understands her options and will ask if she wants medication.  Ambulation, hydration, position changes, birthing ball and tub options to facilitate labor reviewed with pt .  Waterbirth parameters discussed, pt desires if possible  IA fetal monitoring  Re-evaluate PRN    JORGE, SARAHI Iglesias, am serving as a scribe to document services personally performed by Tommy Bourne,  CNM based on the provider's statements to me.- Jeanette Joseph, SARAHI  ATTESTATION: The encounter was performed by me and scribed by the SNM. The scribed note accurately reflects my personal services and decisions made by me.  Erica Meltno, JA CNM

## 2020-02-03 NOTE — PROGRESS NOTES
Blood pressure 112/73, pulse 74, temperature 98  F (36.7  C), temperature source Oral, resp. rate 16, last menstrual period 04/27/2019, not currently breastfeeding.  Patient Vitals for the past 24 hrs:   BP Temp Temp src Pulse Resp   02/03/20 1300 112/73 98  F (36.7  C) Oral 74 16     General appearance: comfortable  Up walking in halls, not feeling contr. No bleeding. Small amt of clear fluid  CONTACTIONS: mild and every 3-4 minutes per palpation. toco off  Pitocin- none,  Antibiotics- none  FETAL HEART TONES: Intermittent auscultation- 125. No decreases heard.  ROM: clear fluid  PELVIC EXAM:deferred  # Pain Assessment:  Current Pain Score 2/3/2020   Patient currently in pain? denies   Pain descriptors -   Shavonne butcher pain level was assessed and she currently denies pain.    Results for orders placed or performed during the hospital encounter of 02/03/20   CBC with platelets differential     Status: None   Result Value Ref Range    WBC 8.8 4.0 - 11.0 10e9/L    RBC Count 4.00 3.8 - 5.2 10e12/L    Hemoglobin 12.8 11.7 - 15.7 g/dL    Hematocrit 36.9 35.0 - 47.0 %    MCV 92 78 - 100 fl    MCH 32.0 26.5 - 33.0 pg    MCHC 34.7 31.5 - 36.5 g/dL    RDW 13.7 10.0 - 15.0 %    Platelet Count 250 150 - 450 10e9/L    Diff Method Automated Method     % Neutrophils 72.7 %    % Lymphocytes 16.2 %    % Monocytes 8.4 %    % Eosinophils 0.7 %    % Basophils 0.1 %    % Immature Granulocytes 1.9 %    Nucleated RBCs 0 0 /100    Absolute Neutrophil 6.4 1.6 - 8.3 10e9/L    Absolute Lymphocytes 1.4 0.8 - 5.3 10e9/L    Absolute Monocytes 0.7 0.0 - 1.3 10e9/L    Absolute Eosinophils 0.1 0.0 - 0.7 10e9/L    Absolute Basophils 0.0 0.0 - 0.2 10e9/L    Abs Immature Granulocytes 0.2 0 - 0.4 10e9/L    Absolute Nucleated RBC 0.0    Glucose tolerance gest screen 1 hour     Status: None   Result Value Ref Range    Glu Gest Screen 1hr 50g 94    Hepatitis B surface antigen     Status: None   Result Value Ref Range    Hep B Surface Agn nonreactive    HIV  Antigen Antibody Combo     Status: None   Result Value Ref Range    HIV Antigen Antibody Combo non reactive    Rubella Antibody IgG Quantitative     Status: None   Result Value Ref Range    Rubella NELLA IgG immune     Rubella Antibody IgG Quantitative 64 IU/mL   ABO/Rh type and screen     Status: None   Result Value Ref Range    ABO AB     RH(D) Pos     Antibody Screen Neg     Test Valid Only At          [facility] [place]    Specimen Expires 02/06/2020    Rupture of Fetal Membranes by ROM Plus     Status: Abnormal   Result Value Ref Range    Rupture of Fetal Membranes by ROM Plus Positive (A) NEG^Negative   Chlamydia trachomatis PCR     Status: None   Result Value Ref Range    Chlamydia Trachomatis PCR negative    Neisseria gonorrhoeae PCR     Status: None   Result Value Ref Range    N Gonorrhea PCR negative    Results for orders placed or performed in visit on 02/03/20   Clinitek Urine Macroscopic POCT     Status: None   Result Value Ref Range    Color Urine Yellow     Appearance Urine Clear     Glucose Urine Negative NEG^Negative mg/dL    Bilirubin Urine Negative NEG^Negative    Ketones Urine Negative NEG^Negative mg/dL    Specific Gravity Urine 1.010 1.003 - 1.035    Blood Urine Negative NEG^Negative    pH Urine 7.0 5.0 - 7.0 pH    Protein Albumin Urine Negative NEG^Negative mg/dL    Urobilinogen Urine 0.2 0.2 - 1.0 EU/dL    Nitrite Urine Negative NEG^Negative    Leukocyte Esterase Urine Negative NEG^Negative     ASSESSMENT:  ==============  IUP @ 40w2d with SROM without labor and SROM x 9 hours   AMA  Fetal Heart Rate Tracing no decreases heard with intermittent auscultation  GBS- negative  Patient Active Problem List   Diagnosis     History of abnormal cervical Pap smear     Supervision of primigravida of advanced maternal age, WHS CNM     Myopia     ADD (attention deficit disorder) without hyperactivity     Encounter for triage in pregnant patient     Labor and delivery,  indication for care     PLAN:  ===========  Planning expectant management x 12 hours post SROM, then open to reviewing plan  Ambulation, hydration, position changes, birthing ball/sling and tub options to facilitate labor.   Erica Melton, JA CNM

## 2020-02-03 NOTE — PLAN OF CARE
Data: Patient presented to [facility] at 1130.   Reason for maternal/fetal assessment per patient is Rule out rupture of membranes  .  Patient is a . Prenatal record reviewed.      OB History    Para Term  AB Living   1 0 0 0 0 0   SAB TAB Ectopic Multiple Live Births   0 0 0 0 0      # Outcome Date GA Lbr Octavio/2nd Weight Sex Delivery Anes PTL Lv   1 Current            . Medical history:   Past Medical History:   Diagnosis Date     Abnormal Pap smear of cervix      Hx of colposcopy with cervical biopsy    . Gestational Age 40w2d. VSS. Fetal movement present. Patient denies cramping, backache, vaginal discharge, pelvic pressure, UTI symptoms, GI problems, bloody show, vaginal bleeding, edema, headache, visual disturbances, epigastric or URQ pain, abdominal pain. Support persons Ahsan,  not present but en route to hospital.  Action: Verbal consent for EFM. Triage assessment completed. EFM applied for fetal assessment. Uterine assessment via TOCO. Fetal assessment: Presumed adequate fetal oxygenation documented (see flow record).   Response: Tommy Bourne CNM informed of arrival and status. Plan per provider is admit for expectant management. Patient verbalized agreement with plan. Patient transferred to room 442 ambulatory, oriented to room and call light.

## 2020-02-03 NOTE — PROVIDER NOTIFICATION
02/03/20 1200   Provider Notification   Provider Name/Title LUCERO Bourne   Method of Notification In Department   Request Evaluate in Person   Notification Reason Patient Arrived   Provider notified of patient arrival for rupture of membranes. Pt appears grossly ruptured with amniotic fluid and bloody show dripping down leg. FHR reactive. Feeling some contractions.

## 2020-02-04 ENCOUNTER — ANESTHESIA (OUTPATIENT)
Dept: OBGYN | Facility: CLINIC | Age: 40
End: 2020-02-04
Payer: COMMERCIAL

## 2020-02-04 ENCOUNTER — ANESTHESIA EVENT (OUTPATIENT)
Dept: OBGYN | Facility: CLINIC | Age: 40
End: 2020-02-04
Payer: COMMERCIAL

## 2020-02-04 PROBLEM — Z98.891 S/P C-SECTION: Status: ACTIVE | Noted: 2020-02-04

## 2020-02-04 LAB
BACTERIA SPEC CULT: NORMAL
Lab: NORMAL
SPECIMEN SOURCE: NORMAL
T PALLIDUM AB SER QL: NONREACTIVE

## 2020-02-04 PROCEDURE — 25000128 H RX IP 250 OP 636: Performed by: STUDENT IN AN ORGANIZED HEALTH CARE EDUCATION/TRAINING PROGRAM

## 2020-02-04 PROCEDURE — 25800030 ZZH RX IP 258 OP 636: Performed by: STUDENT IN AN ORGANIZED HEALTH CARE EDUCATION/TRAINING PROGRAM

## 2020-02-04 PROCEDURE — 25800030 ZZH RX IP 258 OP 636: Performed by: ADVANCED PRACTICE MIDWIFE

## 2020-02-04 PROCEDURE — 25000132 ZZH RX MED GY IP 250 OP 250 PS 637: Performed by: STUDENT IN AN ORGANIZED HEALTH CARE EDUCATION/TRAINING PROGRAM

## 2020-02-04 PROCEDURE — 40000671 ZZH STATISTIC ANESTHESIA CASE

## 2020-02-04 PROCEDURE — 12000001 ZZH R&B MED SURG/OB UMMC

## 2020-02-04 PROCEDURE — 71000012 ZZH RECOVERY PHASE 1 LEVEL 1 FIRST HR: Performed by: OBSTETRICS & GYNECOLOGY

## 2020-02-04 PROCEDURE — 40000170 ZZH STATISTIC PRE-PROCEDURE ASSESSMENT II: Performed by: OBSTETRICS & GYNECOLOGY

## 2020-02-04 PROCEDURE — 37000009 ZZH ANESTHESIA TECHNICAL FEE, EACH ADDTL 15 MIN: Performed by: OBSTETRICS & GYNECOLOGY

## 2020-02-04 PROCEDURE — 25000125 ZZHC RX 250: Performed by: STUDENT IN AN ORGANIZED HEALTH CARE EDUCATION/TRAINING PROGRAM

## 2020-02-04 PROCEDURE — 36000059 ZZH SURGERY LEVEL 3 EA 15 ADDTL MIN UMMC: Performed by: OBSTETRICS & GYNECOLOGY

## 2020-02-04 PROCEDURE — 27110028 ZZH OR GENERAL SUPPLY NON-STERILE: Performed by: OBSTETRICS & GYNECOLOGY

## 2020-02-04 PROCEDURE — 25000132 ZZH RX MED GY IP 250 OP 250 PS 637

## 2020-02-04 PROCEDURE — 37000008 ZZH ANESTHESIA TECHNICAL FEE, 1ST 30 MIN: Performed by: OBSTETRICS & GYNECOLOGY

## 2020-02-04 PROCEDURE — C9290 INJ, BUPIVACAINE LIPOSOME: HCPCS | Performed by: STUDENT IN AN ORGANIZED HEALTH CARE EDUCATION/TRAINING PROGRAM

## 2020-02-04 PROCEDURE — 25000125 ZZHC RX 250

## 2020-02-04 PROCEDURE — 25000128 H RX IP 250 OP 636

## 2020-02-04 PROCEDURE — 25000128 H RX IP 250 OP 636: Performed by: ADVANCED PRACTICE MIDWIFE

## 2020-02-04 PROCEDURE — 36000057 ZZH SURGERY LEVEL 3 1ST 30 MIN - UMMC: Performed by: OBSTETRICS & GYNECOLOGY

## 2020-02-04 PROCEDURE — 27210794 ZZH OR GENERAL SUPPLY STERILE: Performed by: OBSTETRICS & GYNECOLOGY

## 2020-02-04 RX ORDER — ACETAMINOPHEN 325 MG/1
975 TABLET ORAL EVERY 8 HOURS
Status: DISCONTINUED | OUTPATIENT
Start: 2020-02-04 | End: 2020-02-07 | Stop reason: HOSPADM

## 2020-02-04 RX ORDER — CITRIC ACID/SODIUM CITRATE 334-500MG
30 SOLUTION, ORAL ORAL
Status: DISCONTINUED | OUTPATIENT
Start: 2020-02-04 | End: 2020-02-04 | Stop reason: HOSPADM

## 2020-02-04 RX ORDER — DEXTROSE, SODIUM CHLORIDE, SODIUM LACTATE, POTASSIUM CHLORIDE, AND CALCIUM CHLORIDE 5; .6; .31; .03; .02 G/100ML; G/100ML; G/100ML; G/100ML; G/100ML
INJECTION, SOLUTION INTRAVENOUS CONTINUOUS
Status: DISCONTINUED | OUTPATIENT
Start: 2020-02-04 | End: 2020-02-07 | Stop reason: HOSPADM

## 2020-02-04 RX ORDER — LIDOCAINE 40 MG/G
CREAM TOPICAL
Status: DISCONTINUED | OUTPATIENT
Start: 2020-02-04 | End: 2020-02-04

## 2020-02-04 RX ORDER — ONDANSETRON 2 MG/ML
4 INJECTION INTRAMUSCULAR; INTRAVENOUS EVERY 30 MIN PRN
Status: DISCONTINUED | OUTPATIENT
Start: 2020-02-04 | End: 2020-02-05

## 2020-02-04 RX ORDER — DIPHENHYDRAMINE HCL 25 MG
25 CAPSULE ORAL EVERY 6 HOURS PRN
Status: DISCONTINUED | OUTPATIENT
Start: 2020-02-04 | End: 2020-02-07 | Stop reason: HOSPADM

## 2020-02-04 RX ORDER — OXYTOCIN/0.9 % SODIUM CHLORIDE 30/500 ML
PLASTIC BAG, INJECTION (ML) INTRAVENOUS
Status: COMPLETED
Start: 2020-02-04 | End: 2020-02-04

## 2020-02-04 RX ORDER — IBUPROFEN 800 MG/1
800 TABLET, FILM COATED ORAL EVERY 6 HOURS PRN
Status: DISCONTINUED | OUTPATIENT
Start: 2020-02-04 | End: 2020-02-07 | Stop reason: HOSPADM

## 2020-02-04 RX ORDER — ONDANSETRON 2 MG/ML
INJECTION INTRAMUSCULAR; INTRAVENOUS PRN
Status: DISCONTINUED | OUTPATIENT
Start: 2020-02-04 | End: 2020-02-04

## 2020-02-04 RX ORDER — SODIUM CHLORIDE, SODIUM LACTATE, POTASSIUM CHLORIDE, CALCIUM CHLORIDE 600; 310; 30; 20 MG/100ML; MG/100ML; MG/100ML; MG/100ML
INJECTION, SOLUTION INTRAVENOUS CONTINUOUS
Status: DISCONTINUED | OUTPATIENT
Start: 2020-02-04 | End: 2020-02-04 | Stop reason: HOSPADM

## 2020-02-04 RX ORDER — NALBUPHINE HYDROCHLORIDE 10 MG/ML
2.5-5 INJECTION, SOLUTION INTRAMUSCULAR; INTRAVENOUS; SUBCUTANEOUS EVERY 6 HOURS PRN
Status: DISCONTINUED | OUTPATIENT
Start: 2020-02-04 | End: 2020-02-04

## 2020-02-04 RX ORDER — BISACODYL 10 MG
10 SUPPOSITORY, RECTAL RECTAL DAILY PRN
Status: DISCONTINUED | OUTPATIENT
Start: 2020-02-06 | End: 2020-02-07 | Stop reason: HOSPADM

## 2020-02-04 RX ORDER — CARBOPROST TROMETHAMINE 250 UG/ML
250 INJECTION, SOLUTION INTRAMUSCULAR
Status: DISCONTINUED | OUTPATIENT
Start: 2020-02-04 | End: 2020-02-07 | Stop reason: HOSPADM

## 2020-02-04 RX ORDER — EPHEDRINE SULFATE 50 MG/ML
INJECTION, SOLUTION INTRAMUSCULAR; INTRAVENOUS; SUBCUTANEOUS
Status: DISCONTINUED
Start: 2020-02-04 | End: 2020-02-04 | Stop reason: HOSPADM

## 2020-02-04 RX ORDER — MORPHINE SULFATE 1 MG/ML
INJECTION, SOLUTION EPIDURAL; INTRATHECAL; INTRAVENOUS PRN
Status: DISCONTINUED | OUTPATIENT
Start: 2020-02-04 | End: 2020-02-04

## 2020-02-04 RX ORDER — ACETAMINOPHEN 325 MG/1
650 TABLET ORAL EVERY 4 HOURS PRN
Status: DISCONTINUED | OUTPATIENT
Start: 2020-02-07 | End: 2020-02-07 | Stop reason: HOSPADM

## 2020-02-04 RX ORDER — AMOXICILLIN 250 MG
2 CAPSULE ORAL 2 TIMES DAILY PRN
Status: DISCONTINUED | OUTPATIENT
Start: 2020-02-04 | End: 2020-02-07 | Stop reason: HOSPADM

## 2020-02-04 RX ORDER — LIDOCAINE HCL/EPINEPHRINE/PF 2%-1:200K
VIAL (ML) INJECTION PRN
Status: DISCONTINUED | OUTPATIENT
Start: 2020-02-04 | End: 2020-02-04

## 2020-02-04 RX ORDER — FENTANYL/BUPIVACAINE/NS/PF 2-1250MCG
PLASTIC BAG, INJECTION (ML) INJECTION
Status: COMPLETED
Start: 2020-02-04 | End: 2020-02-04

## 2020-02-04 RX ORDER — PHENYLEPHRINE HCL IN 0.9% NACL 1 MG/10 ML
SYRINGE (ML) INTRAVENOUS CONTINUOUS PRN
Status: DISCONTINUED | OUTPATIENT
Start: 2020-02-04 | End: 2020-02-04

## 2020-02-04 RX ORDER — FENTANYL CITRATE 50 UG/ML
INJECTION, SOLUTION INTRAMUSCULAR; INTRAVENOUS PRN
Status: DISCONTINUED | OUTPATIENT
Start: 2020-02-04 | End: 2020-02-04

## 2020-02-04 RX ORDER — FENTANYL CITRATE 50 UG/ML
25-50 INJECTION, SOLUTION INTRAMUSCULAR; INTRAVENOUS
Status: DISCONTINUED | OUTPATIENT
Start: 2020-02-04 | End: 2020-02-04 | Stop reason: HOSPADM

## 2020-02-04 RX ORDER — MISOPROSTOL 200 UG/1
400 TABLET ORAL
Status: DISCONTINUED | OUTPATIENT
Start: 2020-02-04 | End: 2020-02-07 | Stop reason: HOSPADM

## 2020-02-04 RX ORDER — HYDROCORTISONE 2.5 %
CREAM (GRAM) TOPICAL 3 TIMES DAILY PRN
Status: DISCONTINUED | OUTPATIENT
Start: 2020-02-04 | End: 2020-02-07 | Stop reason: HOSPADM

## 2020-02-04 RX ORDER — SODIUM CHLORIDE, SODIUM LACTATE, POTASSIUM CHLORIDE, CALCIUM CHLORIDE 600; 310; 30; 20 MG/100ML; MG/100ML; MG/100ML; MG/100ML
INJECTION, SOLUTION INTRAVENOUS
Status: DISCONTINUED
Start: 2020-02-04 | End: 2020-02-04 | Stop reason: HOSPADM

## 2020-02-04 RX ORDER — CITRIC ACID/SODIUM CITRATE 334-500MG
SOLUTION, ORAL ORAL
Status: COMPLETED
Start: 2020-02-04 | End: 2020-02-04

## 2020-02-04 RX ORDER — MORPHINE SULFATE 1 MG/ML
100 INJECTION, SOLUTION EPIDURAL; INTRATHECAL; INTRAVENOUS ONCE
Status: DISCONTINUED | OUTPATIENT
Start: 2020-02-04 | End: 2020-02-04

## 2020-02-04 RX ORDER — OXYTOCIN/0.9 % SODIUM CHLORIDE 30/500 ML
340 PLASTIC BAG, INJECTION (ML) INTRAVENOUS CONTINUOUS PRN
Status: DISCONTINUED | OUTPATIENT
Start: 2020-02-04 | End: 2020-02-07 | Stop reason: HOSPADM

## 2020-02-04 RX ORDER — OXYTOCIN/0.9 % SODIUM CHLORIDE 30/500 ML
100 PLASTIC BAG, INJECTION (ML) INTRAVENOUS CONTINUOUS
Status: DISCONTINUED | OUTPATIENT
Start: 2020-02-04 | End: 2020-02-07 | Stop reason: HOSPADM

## 2020-02-04 RX ORDER — OXYTOCIN/0.9 % SODIUM CHLORIDE 30/500 ML
PLASTIC BAG, INJECTION (ML) INTRAVENOUS CONTINUOUS PRN
Status: DISCONTINUED | OUTPATIENT
Start: 2020-02-04 | End: 2020-02-04

## 2020-02-04 RX ORDER — SODIUM CHLORIDE, SODIUM LACTATE, POTASSIUM CHLORIDE, CALCIUM CHLORIDE 600; 310; 30; 20 MG/100ML; MG/100ML; MG/100ML; MG/100ML
INJECTION, SOLUTION INTRAVENOUS CONTINUOUS PRN
Status: DISCONTINUED | OUTPATIENT
Start: 2020-02-04 | End: 2020-02-04

## 2020-02-04 RX ORDER — FENTANYL CITRATE 50 UG/ML
10 INJECTION, SOLUTION INTRAMUSCULAR; INTRAVENOUS ONCE
Status: DISCONTINUED | OUTPATIENT
Start: 2020-02-04 | End: 2020-02-04

## 2020-02-04 RX ORDER — CEFAZOLIN SODIUM 2 G/100ML
2 INJECTION, SOLUTION INTRAVENOUS
Status: COMPLETED | OUTPATIENT
Start: 2020-02-04 | End: 2020-02-04

## 2020-02-04 RX ORDER — ONDANSETRON 2 MG/ML
4 INJECTION INTRAMUSCULAR; INTRAVENOUS EVERY 6 HOURS PRN
Status: DISCONTINUED | OUTPATIENT
Start: 2020-02-04 | End: 2020-02-07 | Stop reason: HOSPADM

## 2020-02-04 RX ORDER — SODIUM CHLORIDE, SODIUM LACTATE, POTASSIUM CHLORIDE, CALCIUM CHLORIDE 600; 310; 30; 20 MG/100ML; MG/100ML; MG/100ML; MG/100ML
INJECTION, SOLUTION INTRAVENOUS CONTINUOUS
Status: DISCONTINUED | OUTPATIENT
Start: 2020-02-04 | End: 2020-02-07 | Stop reason: HOSPADM

## 2020-02-04 RX ORDER — DIPHENHYDRAMINE HYDROCHLORIDE 50 MG/ML
25 INJECTION INTRAMUSCULAR; INTRAVENOUS EVERY 6 HOURS PRN
Status: DISCONTINUED | OUTPATIENT
Start: 2020-02-04 | End: 2020-02-07 | Stop reason: HOSPADM

## 2020-02-04 RX ORDER — NALOXONE HYDROCHLORIDE 0.4 MG/ML
.1-.4 INJECTION, SOLUTION INTRAMUSCULAR; INTRAVENOUS; SUBCUTANEOUS
Status: DISCONTINUED | OUTPATIENT
Start: 2020-02-04 | End: 2020-02-04

## 2020-02-04 RX ORDER — LABETALOL 20 MG/4 ML (5 MG/ML) INTRAVENOUS SYRINGE
10
Status: DISCONTINUED | OUTPATIENT
Start: 2020-02-04 | End: 2020-02-04 | Stop reason: HOSPADM

## 2020-02-04 RX ORDER — KETOROLAC TROMETHAMINE 30 MG/ML
INJECTION, SOLUTION INTRAMUSCULAR; INTRAVENOUS PRN
Status: DISCONTINUED | OUTPATIENT
Start: 2020-02-04 | End: 2020-02-04

## 2020-02-04 RX ORDER — OXYTOCIN 10 [USP'U]/ML
10 INJECTION, SOLUTION INTRAMUSCULAR; INTRAVENOUS
Status: DISCONTINUED | OUTPATIENT
Start: 2020-02-04 | End: 2020-02-07 | Stop reason: HOSPADM

## 2020-02-04 RX ORDER — NALOXONE HYDROCHLORIDE 0.4 MG/ML
.1-.4 INJECTION, SOLUTION INTRAMUSCULAR; INTRAVENOUS; SUBCUTANEOUS
Status: DISCONTINUED | OUTPATIENT
Start: 2020-02-04 | End: 2020-02-07 | Stop reason: HOSPADM

## 2020-02-04 RX ORDER — FLUMAZENIL 0.1 MG/ML
0.2 INJECTION, SOLUTION INTRAVENOUS
Status: DISCONTINUED | OUTPATIENT
Start: 2020-02-04 | End: 2020-02-04 | Stop reason: HOSPADM

## 2020-02-04 RX ORDER — KETOROLAC TROMETHAMINE 30 MG/ML
30 INJECTION, SOLUTION INTRAMUSCULAR; INTRAVENOUS EVERY 6 HOURS
Status: DISPENSED | OUTPATIENT
Start: 2020-02-04 | End: 2020-02-05

## 2020-02-04 RX ORDER — LIDOCAINE 40 MG/G
CREAM TOPICAL
Status: DISCONTINUED | OUTPATIENT
Start: 2020-02-04 | End: 2020-02-07 | Stop reason: HOSPADM

## 2020-02-04 RX ORDER — AMOXICILLIN 250 MG
1 CAPSULE ORAL 2 TIMES DAILY PRN
Status: DISCONTINUED | OUTPATIENT
Start: 2020-02-04 | End: 2020-02-07 | Stop reason: HOSPADM

## 2020-02-04 RX ORDER — ONDANSETRON 4 MG/1
4 TABLET, ORALLY DISINTEGRATING ORAL EVERY 30 MIN PRN
Status: DISCONTINUED | OUTPATIENT
Start: 2020-02-04 | End: 2020-02-05

## 2020-02-04 RX ORDER — PHENYLEPHRINE HCL IN 0.9% NACL 1 MG/10 ML
100 SYRINGE (ML) INTRAVENOUS EVERY 5 MIN PRN
Status: DISCONTINUED | OUTPATIENT
Start: 2020-02-04 | End: 2020-02-04

## 2020-02-04 RX ORDER — METHYLERGONOVINE MALEATE 0.2 MG/ML
200 INJECTION INTRAVENOUS
Status: DISCONTINUED | OUTPATIENT
Start: 2020-02-04 | End: 2020-02-07 | Stop reason: HOSPADM

## 2020-02-04 RX ORDER — POLYETHYLENE GLYCOL 3350 17 G/17G
17 POWDER, FOR SOLUTION ORAL DAILY
Status: DISCONTINUED | OUTPATIENT
Start: 2020-02-05 | End: 2020-02-07 | Stop reason: HOSPADM

## 2020-02-04 RX ORDER — OXYCODONE HYDROCHLORIDE 5 MG/1
5-10 TABLET ORAL
Status: DISCONTINUED | OUTPATIENT
Start: 2020-02-04 | End: 2020-02-07 | Stop reason: HOSPADM

## 2020-02-04 RX ORDER — BUPIVACAINE HYDROCHLORIDE 7.5 MG/ML
1.6 INJECTION, SOLUTION EPIDURAL; RETROBULBAR ONCE
Status: DISCONTINUED | OUTPATIENT
Start: 2020-02-04 | End: 2020-02-04

## 2020-02-04 RX ORDER — HYDROMORPHONE HYDROCHLORIDE 1 MG/ML
.3-.5 INJECTION, SOLUTION INTRAMUSCULAR; INTRAVENOUS; SUBCUTANEOUS EVERY 10 MIN PRN
Status: DISCONTINUED | OUTPATIENT
Start: 2020-02-04 | End: 2020-02-05

## 2020-02-04 RX ORDER — CITRIC ACID/SODIUM CITRATE 334-500MG
30 SOLUTION, ORAL ORAL ONCE
Status: COMPLETED | OUTPATIENT
Start: 2020-02-04 | End: 2020-02-04

## 2020-02-04 RX ORDER — LANOLIN 100 %
OINTMENT (GRAM) TOPICAL
Status: DISCONTINUED | OUTPATIENT
Start: 2020-02-04 | End: 2020-02-07 | Stop reason: HOSPADM

## 2020-02-04 RX ORDER — MEPERIDINE HYDROCHLORIDE 25 MG/ML
12.5 INJECTION INTRAMUSCULAR; INTRAVENOUS; SUBCUTANEOUS
Status: DISCONTINUED | OUTPATIENT
Start: 2020-02-04 | End: 2020-02-05

## 2020-02-04 RX ORDER — CEFAZOLIN SODIUM 2 G/100ML
2 INJECTION, SOLUTION INTRAVENOUS ONCE
Status: CANCELLED | OUTPATIENT
Start: 2020-02-04

## 2020-02-04 RX ORDER — SIMETHICONE 80 MG
80 TABLET,CHEWABLE ORAL 4 TIMES DAILY PRN
Status: DISCONTINUED | OUTPATIENT
Start: 2020-02-04 | End: 2020-02-07 | Stop reason: HOSPADM

## 2020-02-04 RX ORDER — NALOXONE HYDROCHLORIDE 0.4 MG/ML
.1-.4 INJECTION, SOLUTION INTRAMUSCULAR; INTRAVENOUS; SUBCUTANEOUS
Status: DISCONTINUED | OUTPATIENT
Start: 2020-02-04 | End: 2020-02-04 | Stop reason: HOSPADM

## 2020-02-04 RX ORDER — CEFAZOLIN SODIUM 1 G/3ML
1 INJECTION, POWDER, FOR SOLUTION INTRAMUSCULAR; INTRAVENOUS SEE ADMIN INSTRUCTIONS
Status: DISCONTINUED | OUTPATIENT
Start: 2020-02-04 | End: 2020-02-04 | Stop reason: HOSPADM

## 2020-02-04 RX ORDER — EPHEDRINE SULFATE 50 MG/ML
5 INJECTION, SOLUTION INTRAMUSCULAR; INTRAVENOUS; SUBCUTANEOUS
Status: DISCONTINUED | OUTPATIENT
Start: 2020-02-04 | End: 2020-02-04

## 2020-02-04 RX ORDER — NALOXONE HYDROCHLORIDE 0.4 MG/ML
.1-.4 INJECTION, SOLUTION INTRAMUSCULAR; INTRAVENOUS; SUBCUTANEOUS
Status: DISCONTINUED | OUTPATIENT
Start: 2020-02-04 | End: 2020-02-05

## 2020-02-04 RX ADMIN — Medication 100 ML/HR: at 21:22

## 2020-02-04 RX ADMIN — KETOROLAC TROMETHAMINE 30 MG: 30 INJECTION, SOLUTION INTRAMUSCULAR at 19:47

## 2020-02-04 RX ADMIN — ONDANSETRON 4 MG: 2 INJECTION INTRAMUSCULAR; INTRAVENOUS at 18:36

## 2020-02-04 RX ADMIN — SODIUM CHLORIDE, POTASSIUM CHLORIDE, SODIUM LACTATE AND CALCIUM CHLORIDE 1000 ML: 600; 310; 30; 20 INJECTION, SOLUTION INTRAVENOUS at 08:45

## 2020-02-04 RX ADMIN — Medication 30 ML: at 18:09

## 2020-02-04 RX ADMIN — FENTANYL CITRATE 100 MCG: 50 INJECTION, SOLUTION INTRAMUSCULAR; INTRAVENOUS at 08:35

## 2020-02-04 RX ADMIN — SODIUM CHLORIDE, POTASSIUM CHLORIDE, SODIUM LACTATE AND CALCIUM CHLORIDE: 600; 310; 30; 20 INJECTION, SOLUTION INTRAVENOUS at 05:50

## 2020-02-04 RX ADMIN — LIDOCAINE HYDROCHLORIDE,EPINEPHRINE BITARTRATE 5 ML: 20; .005 INJECTION, SOLUTION EPIDURAL; INFILTRATION; INTRACAUDAL; PERINEURAL at 18:11

## 2020-02-04 RX ADMIN — PHENYLEPHRINE HYDROCHLORIDE 200 MCG: 10 INJECTION INTRAVENOUS at 19:04

## 2020-02-04 RX ADMIN — SODIUM CHLORIDE, POTASSIUM CHLORIDE, SODIUM LACTATE AND CALCIUM CHLORIDE: 600; 310; 30; 20 INJECTION, SOLUTION INTRAVENOUS at 18:16

## 2020-02-04 RX ADMIN — PHENYLEPHRINE HYDROCHLORIDE 200 MCG: 10 INJECTION INTRAVENOUS at 18:34

## 2020-02-04 RX ADMIN — Medication: at 12:02

## 2020-02-04 RX ADMIN — PHENYLEPHRINE HYDROCHLORIDE 200 MCG: 10 INJECTION INTRAVENOUS at 18:47

## 2020-02-04 RX ADMIN — PHENYLEPHRINE HYDROCHLORIDE 200 MCG: 10 INJECTION INTRAVENOUS at 19:11

## 2020-02-04 RX ADMIN — FENTANYL CITRATE 75 MCG: 50 INJECTION, SOLUTION INTRAMUSCULAR; INTRAVENOUS at 18:22

## 2020-02-04 RX ADMIN — Medication 50 MCG/MIN: at 18:17

## 2020-02-04 RX ADMIN — OXYTOCIN-SODIUM CHLORIDE 0.9% IV SOLN 30 UNIT/500ML 300 ML/HR: 30-0.9/5 SOLUTION at 18:44

## 2020-02-04 RX ADMIN — LIDOCAINE HYDROCHLORIDE,EPINEPHRINE BITARTRATE 5 ML: 20; .005 INJECTION, SOLUTION EPIDURAL; INFILTRATION; INTRACAUDAL; PERINEURAL at 18:20

## 2020-02-04 RX ADMIN — ACETAMINOPHEN 975 MG: 325 TABLET, FILM COATED ORAL at 23:59

## 2020-02-04 RX ADMIN — SODIUM CHLORIDE, POTASSIUM CHLORIDE, SODIUM LACTATE AND CALCIUM CHLORIDE: 600; 310; 30; 20 INJECTION, SOLUTION INTRAVENOUS at 10:50

## 2020-02-04 RX ADMIN — SODIUM CITRATE AND CITRIC ACID MONOHYDRATE 30 ML: 500; 334 SOLUTION ORAL at 18:09

## 2020-02-04 RX ADMIN — Medication 20 ML: at 19:54

## 2020-02-04 RX ADMIN — MORPHINE SULFATE 2 MG: 1 INJECTION, SOLUTION EPIDURAL; INTRATHECAL; INTRAVENOUS at 18:53

## 2020-02-04 RX ADMIN — BUPIVACAINE 20 ML: 13.3 INJECTION, SUSPENSION, LIPOSOMAL INFILTRATION at 19:54

## 2020-02-04 RX ADMIN — PHENYLEPHRINE HYDROCHLORIDE 100 MCG: 10 INJECTION INTRAVENOUS at 18:38

## 2020-02-04 RX ADMIN — CEFAZOLIN SODIUM 2 G: 2 INJECTION, SOLUTION INTRAVENOUS at 18:24

## 2020-02-04 RX ADMIN — PHENYLEPHRINE HYDROCHLORIDE 100 MCG: 10 INJECTION INTRAVENOUS at 18:40

## 2020-02-04 RX ADMIN — FENTANYL CITRATE 100 MCG: 50 INJECTION, SOLUTION INTRAMUSCULAR; INTRAVENOUS at 10:11

## 2020-02-04 ASSESSMENT — ENCOUNTER SYMPTOMS: SEIZURES: 0

## 2020-02-04 NOTE — PROGRESS NOTES
Blood pressure 124/67, pulse 103, temperature 98.3  F (36.8  C), temperature source Oral, resp. rate 22, last menstrual period 04/27/2019, SpO2 100 %, not currently breastfeeding.  Patient Vitals for the past 24 hrs:   BP Temp Temp src Pulse Resp SpO2   02/04/20 1530 124/67 -- -- -- -- 100 %   02/04/20 1526 -- 98.3  F (36.8  C) Oral -- -- --   02/04/20 1520 -- 99.9  F (37.7  C) Axillary -- -- --   02/04/20 1430 108/60 98.5  F (36.9  C) -- -- -- 100 %   02/04/20 1405 109/77 -- -- -- -- 100 %   02/04/20 1325 130/76 -- -- -- -- 100 %   02/04/20 1320 124/83 -- -- -- -- 100 %   02/04/20 1315 113/74 -- -- -- -- 100 %   02/04/20 1310 122/75 -- -- -- -- 100 %   02/04/20 1305 119/75 -- -- -- -- 100 %   02/04/20 1300 124/76 -- -- -- -- 100 %   02/04/20 1255 123/79 -- -- -- -- 100 %   02/04/20 1250 125/81 -- -- -- -- 100 %   02/04/20 1245 114/76 -- -- -- -- 100 %   02/04/20 1240 117/68 -- -- -- -- 98 %   02/04/20 1230 109/67 -- -- -- -- 98 %   02/04/20 1225 114/65 -- -- -- -- 97 %   02/04/20 1220 118/75 -- -- -- -- 98 %   02/04/20 1217 110/73 -- -- -- -- 98 %   02/04/20 1215 112/77 -- -- -- -- --   02/04/20 1213 114/77 -- -- -- -- --   02/04/20 1210 119/80 -- -- -- -- 98 %   02/04/20 1208 118/78 -- -- -- -- --   02/04/20 1207 113/74 98.6  F (37  C) Axillary -- -- 98 %   02/04/20 1203 112/74 -- -- -- -- --   02/04/20 1201 116/73 -- -- -- -- --   02/04/20 1159 116/75 -- -- -- -- --   02/04/20 1157 111/69 -- -- -- -- 99 %   02/04/20 1155 109/74 -- -- -- -- 99 %   02/04/20 1148 117/77 -- -- -- -- 99 %   02/04/20 1124 118/84 98  F (36.7  C) Oral -- -- --   02/04/20 0950 115/67 98.3  F (36.8  C) Oral 103 22 --   02/04/20 0739 98/69 97.9  F (36.6  C) Oral 89 22 --   02/04/20 0604 119/62 98.5  F (36.9  C) Oral 94 26 --   02/04/20 0500 -- 97.9  F (36.6  C) Oral -- -- --   02/04/20 0357 123/76 98.3  F (36.8  C) Oral 83 18 --   02/04/20 0300 -- 98  F (36.7  C) Oral -- 18 --   02/04/20 0200 112/69 97.7  F (36.5  C) Oral 92 16 --    02/04/20 0100 -- 98.2  F (36.8  C) Oral -- -- --   02/04/20 0000 106/72 98.6  F (37  C) Oral 62 14 --   02/03/20 2255 99/67 98.7  F (37.1  C) Oral -- 16 --   02/03/20 2045 -- 97.9  F (36.6  C) Oral -- -- --   02/03/20 1930 108/64 98.7  F (37.1  C) Oral -- 16 --   02/03/20 1700 -- 97.7  F (36.5  C) Oral -- -- --     General appearance: comfortable with epidural and working with pressure felt during contractions.   Pushing well in L & R lateral, towel pull with squat bar some fetal descent, now +1 with caput.   CONTRACTIONS: strong and every 3-5 minutes, lasting  seconds. Palpate moderate with soft resting tone.   Pitocin- increased to 7 mu/min.  FETAL HEART TONES: continuous EFM- baseline 140 with moderate variability and no recent accelerations. Intermittent variable decelerations.  ROM: clear fluid  PELVIC EXAM: fetal decent to +1 station with caput    ASSESSMENT:  ==============  IUP @ 40w3d SROM x 31 hours   Fetal Heart Rate Tracing category two  GBS- negative  Patient Active Problem List   Diagnosis     History of abnormal cervical Pap smear     Supervision of primigravida of advanced maternal age, WHS CNM     Myopia     ADD (attention deficit disorder) without hyperactivity     Encounter for triage in pregnant patient     Labor and delivery, indication for care     PLAN:  ===========  Frequent position changes to facilitate pushing   Continue labor augmentation titrating Pitocin as needed per protocol   Continue intrauterine resuscitation prn  Observation and reevaluate in 1 hour, prn   JA Carvajal CNM

## 2020-02-04 NOTE — PROGRESS NOTES
Blood pressure 119/62, pulse 94, temperature 98.5  F (36.9  C), temperature source Oral, resp. rate 26, last menstrual period 04/27/2019, not currently breastfeeding.  General appearance: uncomfortable with contractions. Feeling vaginal and rectal pressure. Requesting SVE. Pt requested not to know dilation, just whether or not it's time to push.    CONTRACTIONS: every 2-4 minutes, lasting 60-80 seconds palpating moderate with soft resting tone  Pitocin- 2 mu/min.,  Antibiotics- none  FETAL HEART TONES: continuous EFM- baseline 145 with moderate variability and positive accelerations. Intermittent vatriable decelerations.  ROM: 23 hours, clear fluid since 0800 2/3/20  PELVIC EXAM: 6/80/-1.   PAIN: working through contractions with breathing, massage, position changes, hydrotherapy.  and , Mary, at bedside and supportive.     ASSESSMENT:  ==============  IUP @ 40w3d in active labor   Fetal Heart Rate Tracing predominantly cat one, occasionally cat 2 with intermittent VD  GBS- negative  Patient Active Problem List   Diagnosis     History of abnormal cervical Pap smear     Supervision of primigravida of advanced maternal age, WHS CNM     Myopia     ADD (attention deficit disorder) without hyperactivity     Encounter for triage in pregnant patient     Labor and delivery, indication for care     PLAN:  ===========  Ambulation, hydration, position changes, birthing ball/sling and tub options to facilitate labor.   Discussed with patient that Pitocin should be titrated up. Pt encouraged to focus on comfort measures and allow nurse to adjust Pitocin per protocol. Pt agrees.   Plan belly sifting when pt ready, discussed with stephen and RN.   Continuous fetal monitoring with intrauterine resuscitation if needed  Reevaluate in 1-2 hours, prn  JA Carvajal CNM

## 2020-02-04 NOTE — PROGRESS NOTES
Blood pressure 124/67, pulse 103, temperature 98.3  F (36.8  C), temperature source Oral, resp. rate 22, last menstrual period 04/27/2019, SpO2 100 %, not currently breastfeeding.  General appearance: feeling pressure with contractions. Pt has been pushing since 1413. Has pushed in various positions with little fetal decent.   Pushed flat on back with knees wide and with knees together in attempt to open ischial spines.   CONTRACTIONS: every 2-4 minutes, lasting  seconds, palpate moderate with soft resting tone between.   Pitocin- 7 mu/min.  FETAL HEART TONES: continuous EFM- baseline 140 with moderate variability and positive accelerations. Intermittent variable decelerations.  ROM: clear fluid  PELVIC EXAM: Pushed to +1 station within first hour, no fetal decent during last hour, increased caput. Feels like more room in back and tight space under pubic bone, possibly OP.     ASSESSMENT:  ==============  IUP @ 40w3d SROM x 32 hours   Fetal Heart Rate Tracing category two  GBS- negative  Patient Active Problem List   Diagnosis     History of abnormal cervical Pap smear     Supervision of primigravida of advanced maternal age, WHS CNM     Myopia     ADD (attention deficit disorder) without hyperactivity     Encounter for triage in pregnant patient     Labor and delivery, indication for care     PLAN:  ===========  Discussed little decent over last hour. Pt states she has good energy to continue pushing.   Push in hands and knees position and robozo/belly sift    Continue frequent position changes to facilitate pushing and fetal position during second stage.   Continue intrauterine resuscitative measures as needed   Plan to reassess fetal decent in 30-45 mins     JA Carvajal CNM     ADDENDUM at 1720:   Now pushing for 3 hours, with little to no fetal decent. Positive fetal scalp stimulation at 1715, but FHT has been cat 2 with moderate variability and variable decels that were previously intermittent and  now are becoming recurrent.   Recommended . Pt teary, but states she would be open to this due to fetal concerns.    Discussed findings with patient and recommended evaluation from MD. Pt agrees.   JA Carvajal CNM    ADDENDUM at 1735:   Spoke with Dr. Singh regarding patient labor status and FHT. States she will come evaluate station and discuss .   Shireen PERES CNM    ADDENDUM at 1755:  Dr. Gold and Dr. Singh pushed through several contractions with patient and recommended C/S. Pt was agreeable. Consent was signed and patient is transferred to their care.

## 2020-02-04 NOTE — PLAN OF CARE
Patient still not feeling contractions.  She is ivan every 2-3 minutes by palpation and external toco.  For a majority of the shift she has been monitored by intermittent auscultation, but used external monitoring to aid in timing nipple stim.  She was using nipple stim to try and strengthen contractions.  She continues to leak clear fluid, afebrile.  Her birthplan is on the front of her chart, she would like to have an un medication water birth if possible, she would like to avoid pitocin infusion. She declines IV placement unless it is imminently necessary.  She also would like to avoid AMTS.  Plan per provider: reassess at 12 hours post rupture.

## 2020-02-04 NOTE — PROGRESS NOTES
Blood pressure 123/76, pulse 83, temperature 97.9  F (36.6  C), temperature source Oral, resp. rate 18, last menstrual period 04/27/2019, not currently breastfeeding.  Patient Vitals for the past 24 hrs:   BP Temp Temp src Pulse Resp   02/04/20 0500 -- 97.9  F (36.6  C) Oral -- --   02/04/20 0357 123/76 98.3  F (36.8  C) Oral 83 18   02/04/20 0300 -- 98  F (36.7  C) Oral -- 18   02/04/20 0200 112/69 97.7  F (36.5  C) Oral 92 16   02/04/20 0100 -- 98.2  F (36.8  C) Oral -- --   02/04/20 0000 106/72 98.6  F (37  C) Oral 62 14   02/03/20 2255 99/67 98.7  F (37.1  C) Oral -- 16   02/03/20 2045 -- 97.9  F (36.6  C) Oral -- --   02/03/20 1930 108/64 98.7  F (37.1  C) Oral -- 16   02/03/20 1700 -- 97.7  F (36.5  C) Oral -- --   02/03/20 1500 -- 97.9  F (36.6  C) Oral -- --   02/03/20 1300 112/73 98  F (36.7  C) Oral 74 16     General appearance: uncomfortable with contractions  Pitocin at 2mu/min all night. Now in tub and feeling some pressure. Breathing well with cont  CONTACTIONS: moderate, cramping and every 2-4 minutes  Pitocin- 2 mu/min.,  Antibiotics- none  FETAL HEART TONES: continuous EFM- baseline 135 with moderate variability and early decelerations.  ROM: clear fluid  PELVIC EXAM:deferred      ASSESSMENT:  ==============  IUP @ 40w3d in active labor, and for induction of labor.  Indication: SROM without labor   SROM x 22 hours   Fetal Heart Rate Tracing category one  GBS- negative  Patient Active Problem List   Diagnosis     History of abnormal cervical Pap smear     Supervision of primigravida of advanced maternal age, WHS CNM     Myopia     ADD (attention deficit disorder) without hyperactivity     Encounter for triage in pregnant patient     Labor and delivery, indication for care     PLAN:  ===========  Continue with labor support measures, planning unmedicated birth  Continue labor induction with Pitocin   Erica Melton, JA TAYLORM

## 2020-02-04 NOTE — PROGRESS NOTES
Blood pressure 98/69, pulse 89, temperature 97.9  F (36.6  C), temperature source Oral, resp. rate 22, last menstrual period 04/27/2019, not currently breastfeeding.  Declines SVE, wanting pain management-Fentanyl and then prepare for epidural  General appearance: uncomfortable with contractions. Has  at bedside providing massage, pressure, affirmations. Recently did sifting from behind while on hands and knees.     CONTRACTIONS: every 2-3 minutes, lasting 60-80 seconds, moderate by palpation, soft resting tone between.   Pitocin- 4 mu/min.,  Antibiotics- none  FETAL HEART TONES: continuous EFM- baseline 130 with moderate variability and positive accelerations. Intermittent varibale decelerations seen recently.   ROM: clear fluid  PELVIC EXAM:deferred  # Pain Assessment:  Current Pain Score 2/4/2020   Patient currently in pain? yes   Pain descriptors Cramping;Pressure   - Shavonne is experiencing pain due to cotnractions. Pain management was discussed and the plan was created in a collaborative fashion.  Shavonne's response to the current recommendations: engaged  - Pt is interested in IV fentanyl and then epidural       ASSESSMENT:  ==============  IUP @ 40w3d SROM x 24 hours   Fetal Heart Rate Tracing category two  GBS- negative  Patient Active Problem List   Diagnosis     History of abnormal cervical Pap smear     Supervision of primigravida of advanced maternal age, WHS CNM     Myopia     ADD (attention deficit disorder) without hyperactivity     Encounter for triage in pregnant patient     Labor and delivery, indication for care     PLAN:  ===========  Ambulation, hydration, position changes, birthing ball/sling and tub options to facilitate labor.  Pain medication options reviewed with pt. Pt is interested in fentanyl and then epidural  Anesthesia called, in OR now but will come place epidural when available.   Observation and reevaluate in 1-2 hours prn  Continue labor augmentation with Pitocin     Shireen  JA Jensen CNM     ADDENDUM at 0905:  100mcg fentanyl at 0835  Pt states she is getting adequate pain relief from fentanyl and would like to continue with this for now.   Anesthesia MD updated

## 2020-02-04 NOTE — PLAN OF CARE
VSS afebrile. Coping well with spouse and  support. Feeling perineal pressure and UTP, but SVE @ 0700 was 6cm, -1 station and a thick anterior cervix. Shavonne does not want to know dilation but knows it is not time to push. Consents to titrate oxytocin as needed. Plan made with Shireen Jensen CNM and pt's  to do some rebozo sifting and inversions to help baby's position. Report given and care relinquished to Leisa Liu RN.

## 2020-02-04 NOTE — ANESTHESIA PREPROCEDURE EVALUATION
Anesthesia Pre-Procedure Evaluation    Patient: Shavonne Vogel   MRN:     [id number] Gender:   female   Age:    40 year old :      1980        Preoperative Diagnosis: * No surgery found *        Past Medical History:   Diagnosis Date     Abnormal Pap smear of cervix      Hx of colposcopy with cervical biopsy       Past Surgical History:   Procedure Laterality Date     NO HISTORY OF SURGERY            Anesthesia Evaluation       history and physical reviewed .             ROS/MED HX    ENT/Pulmonary:  - neg pulmonary ROS     Neurologic:  - neg neurologic ROS     Cardiovascular:  - neg cardiovascular ROS       METS/Exercise Tolerance:     Hematologic:         Musculoskeletal:         GI/Hepatic:  - neg GI/hepatic ROS       Renal/Genitourinary:         Endo:         Psychiatric:         Infectious Disease:         Malignancy:         Other:                         PHYSICAL EXAM:   Mental Status/Neuro: A/A/O   Airway: Facies: Feasible  Mallampati: I  Mouth/Opening: Full  TM distance: > 6 cm  Neck ROM: Full   Respiratory: Auscultation: CTAB     Resp. Rate: Normal     Resp. Effort: Normal      CV: Rhythm: Regular  Rate: Age appropriate  Heart: Normal Sounds  Edema: None   Comments:      Dental: Normal Dentition                LABS:  CBC:   Lab Results   Component Value Date    WBC 8.8 2020    WBC 9.1 2020    HGB 12.8 2020    HGB 12.1 2020    HCT 36.9 2020    HCT 35.7 2020     2020     2020     BMP: No results found for: NA, POTASSIUM, CHLORIDE, CO2, BUN, CR, GLC  COAGS: No results found for: PTT, INR, FIBR  POC:   Lab Results   Component Value Date    HCG  2009     Negative   This test provides a presumptive diagnosis of pregnancy. A confirmed pregnancy   diagnosis should only be made by a physician after all clinical and laboratory   findings have been evaluated.     OTHER:   Lab Results   Component Value Date    SED 4 2009        Preop Vitals     BP Readings from Last 3 Encounters:   02/04/20 115/67   02/03/20 99/73   01/31/20 107/72    Pulse Readings from Last 3 Encounters:   02/04/20 103   02/03/20 86   01/31/20 85      Resp Readings from Last 3 Encounters:   02/04/20 22   12/31/19 18    SpO2 Readings from Last 3 Encounters:   12/30/19 97%   07/12/09 100%      Temp Readings from Last 1 Encounters:   02/04/20 36.8  C (98.3  F) (Oral)    Ht Readings from Last 1 Encounters:   02/03/20 1.524 m (5')      Wt Readings from Last 1 Encounters:   02/03/20 64.2 kg (141 lb 9.6 oz)    Estimated body mass index is 27.65 kg/m  as calculated from the following:    Height as of an earlier encounter on 2/3/20: 1.524 m (5').    Weight as of an earlier encounter on 2/3/20: 64.2 kg (141 lb 9.6 oz).     LDA:  Peripheral IV 02/03/20 Right Lower forearm (Active)   Site Assessment WDL 2/4/2020  7:20 AM   Line Status Infusing 2/4/2020  7:20 AM   Phlebitis Scale 0-->no symptoms 2/4/2020  7:20 AM   Infiltration Scale 0 2/4/2020  7:20 AM   Dressing Intervention Dressing reinforced 2/4/2020  5:17 AM   Number of days: 1        Assessment:   ASA SCORE: 2            Plan:   Anes. Type:  Epidural     Epidural Details:  Catheter; Lumbar   Pre-Medication: None   Induction:  N/a   Airway: Native Airway   Access/Monitoring: PIV   Maintenance: N/a     Postop Plan:   Postop Pain: Regional  Postop Sedation/Airway: Not planned  Disposition: Inpatient/Admit     PONV Management: Adult Risk Factors: Female             neg OB ROS             Sheryl Ramirez MD

## 2020-02-04 NOTE — PROVIDER NOTIFICATION
Tommy Bourne CNM at bedside to check in. Pt is breathing well through ctx and has support from  and spouse. Discussed management of pitocin, Shavonne had declined increasing to 4mL/hr at 0030 and it remains at 2mL/hr. LESLEY Sanders is ok with this for now.

## 2020-02-04 NOTE — PROGRESS NOTES
Blood pressure 115/67, pulse 103, temperature 98.3  F (36.8  C), temperature source Oral, resp. rate 22, last menstrual period 2019, not currently breastfeeding.    General appearance: uncomfortable with contractions. Hand and knees using cub for support.  at bedside and supportive.  providing one to one support including massage and hip sacral pressure.   Belly sifting done between contractions with patient in hands and knees position. Tolerated well.   Patient accepting of SVE, but does not want to know dilation.   CONTRACTIONS: every 2.5-6 minutes, lasting  seconds, moderate by palpation with soft resting tone.   Pitocin- 4 mu/min.,  Antibiotics- none  Fentanyl: x2 doses   FETAL HEART TONES: continuous EFM- baseline 130 with moderate variability and periods of minimal after Fentanyl. Accelerations- none.  Decelerations- variable intermittent.   Fluid bolus and frequent position changes for variable decels.   ROM: clear, blood tinged fluid  PELVIC EXAM: 6cm, now thicker than 80% due to swelling, -1 to -2. Felt suture, likely anterior, in maternal posterior right so likely JAMAAL. Did not feel asynclitic like previous SVE.    PAIN: Patient has been coping well verbalizing and breathing through contractions.      ASSESSMENT:  ==============  IUP @ 40w3d with SROM without labor, then augmented with Pitocin.    Fetal Heart Rate Tracing category two  GBS- negative  Patient Active Problem List   Diagnosis     History of abnormal cervical Pap smear     Supervision of primigravida of advanced maternal age, WHS CNM     Myopia     ADD (attention deficit disorder) without hyperactivity     Encounter for triage in pregnant patient     Labor and delivery, indication for care     PLAN:  ===========  Discussed there has been a lack of cervical change since previous exam and category 2 FHT. Discussed that if FHT does not improve with intervention or worsens birth by  may be indicated. Pt states this  is acceptable.   Discussed that fetal head feels less asynclitic which may be a positive sign and we could continue to titrate Pitocin. Pt agrees, but states she is interested in an epidural prior to titrating Pitocin up.  Anesthesia called to place epidural.   Frequent position changes to facilitate labor with epidural anesthesia.   Close observation of FHT and intrauterine resuscitation prn.     JA Carvajal CNM

## 2020-02-04 NOTE — PROGRESS NOTES
Blood pressure 110/73, pulse 103, temperature 98.6  F (37  C), temperature source Axillary, resp. rate 22, last menstrual period 04/27/2019, SpO2 98 %, not currently breastfeeding.  General appearance: comfortable. Sleeping soundly in high-fowlers position and frog legged position, did not wake upon entering room.     CONTRACTIONS: every 3-4 minutes, lasting  seconds.   Pitocin- Has remained at 4 mu/min since 0711. Started at 2mu/min at 0000.   Antibiotics- none  FETAL HEART TONES: continuous EFM- baseline 130 with moderate variability and positive accelerations. Intermittent variable decelerations.  ROM: clear fluid  PELVIC EXAM:deferred  PAIN: appears to be adequately managed, comfortable with epidural     ASSESSMENT:  ==============  IUP @ 40w3d SROM x 29 hours, pitocin augmentation   Fetal Heart Rate Tracing category two  GBS- negative  Patient Active Problem List   Diagnosis     History of abnormal cervical Pap smear     Supervision of primigravida of advanced maternal age, WHS CNM     Myopia     ADD (attention deficit disorder) without hyperactivity     Encounter for triage in pregnant patient     Labor and delivery, indication for care     PLAN:  ===========  Frequent position changes to facilitate labor with epidural anesthesia.  Continue labor augmentation with Pitocin, titrating per protocol.   Discussed increasing Pitocin with RN, who states she will titrate up.     Observation and reevaluate with SVE in 1 hour    ADDENDUM at 1355:   SVE slight anterior lip, able to be reduced with pressure. 0 station, will start pushing now due to intermittent variable decels and cat 2.   Straight catheter   Plan to start actively pushing  JA Carvajal CNM

## 2020-02-04 NOTE — PLAN OF CARE
Patient reporting increased pain and discomfort.  Requesting an epidural for pain relief.  MDA called and in room at 1130. Patient and procedure correctly identified/ verified with MDA. Consent signed. 1000 mL fluid bolus given prior to epidural placement.  Epidural placed by MDA without complication. Test dose/ bolus given by MDA, patient tolerated well. Patient reports small change in pain after procedure, md monitoring.

## 2020-02-04 NOTE — PROVIDER NOTIFICATION
Tommy Bourne CNM at bedside to evaluate patient status. Shavonne is side-lying in the tub and vocalizing with contractions. Strip reviewed with RN, BRANDONM aware of early decels. UTP is mild to moderate and intermittent. Plan to page midwives when UTP is stronger or with FHT concerns.

## 2020-02-04 NOTE — ANESTHESIA PROCEDURE NOTES
Epidural Procedure Note    Staff:     Anesthesiologist:  Viki Yee MD    Resident/CRNA:  Sheryl Ramirez MD    Procedure performed by resident/CRNA in the presence of a teaching physician    Location: OB     Procedure start time:  2/4/2020 11:24 AM     Procedure end time:  2/4/2020 11:55 AM   Pre-procedure checklist:   patient identified, IV checked, site marked, risks and benefits discussed, informed consent, monitors and equipment checked, pre-op evaluation, at physician/surgeon's request and post-op pain management      Correct Patient: Yes      Correct Position: Yes      Correct Site: Yes      Correct Procedure: Yes      Correct Laterality:  Yes    Site Marked:  Yes  Procedure:     Procedure:  Epidural catheter    ASA:  2    Position:  Sitting    Sterile Prep: chloraprep      Insertion site:  L3-4    Local skin infiltration:  1% lidocaine    amount (mL):  3    Approach:  Midline    Needle gauge (G):  17    Needle Length (in):  3.5    Block Needle Type:  Touhy    Injection Technique:  LORT saline    REYNALDO at (cm):  6    Attempts:  1    Redirects:  0    Catheter gauge (G):  19    Catheter threaded easily: Yes      Threaded to cm at skin:  10    Threaded in epidural space (cm):  2    Paresthesias:  No    Aspiration negative for Heme or CSF: Yes      Test dose (mL):  3     Local anesthetic:  Lidocaine 1.5% w/ 1:200,000 epinephrine    Test dose time:  11:50    Test dose negative for signs of intravascular, subdural or intrathecal injection: Yes

## 2020-02-04 NOTE — PROGRESS NOTES
Blood pressure 108/64, pulse 74, temperature 97.9  F (36.6  C), temperature source Oral, resp. rate 16, last menstrual period 04/27/2019, not currently breastfeeding.  Patient Vitals for the past 24 hrs:   BP Temp Temp src Pulse Resp   02/03/20 2045 -- 97.9  F (36.6  C) Oral -- --   02/03/20 1930 108/64 98.7  F (37.1  C) Oral -- 16   02/03/20 1700 -- 97.7  F (36.5  C) Oral -- --   02/03/20 1500 -- 97.9  F (36.6  C) Oral -- --   02/03/20 1300 112/73 98  F (36.7  C) Oral 74 16     General appearance: comfortable  Resting in bed. Slept some. contr unchanged. occas tightenings. EFM off.   Discussed SROM 14 hours and recommendation of pitocin augmentation. Pt and  now agreeable to plan.  CONTACTIONS: mild and irreg. toco off  FETAL HEART TONES: Intermittent auscultation- 145. No decelerations heard.  ROM: clear fluid  PELVIC EXAM:deferred  # Pain Assessment:  Current Pain Score 2/3/2020   Patient currently in pain? denies   Pain descriptors -   Shavonne butcher pain level was assessed and she currently denies pain.      ASSESSMENT:  ==============  IUP @ 40w2d with SROM without labor and SROM x 14 hours   Fetal Heart Rate Tracing no decreases heard with intermittent auscultation  GBS- negative  Patient Active Problem List   Diagnosis     History of abnormal cervical Pap smear     Supervision of primigravida of advanced maternal age, WHS CNM     Myopia     ADD (attention deficit disorder) without hyperactivity     Encounter for triage in pregnant patient     Labor and delivery, indication for care     PLAN:  ===========  Rest now until active labor  Ambulation, hydration, position changes, birthing ball/sling and tub options to facilitate labor when awake.  Labor induction with Pitocin reviewed with pt and . Agreeable to plan.   JA Escamilla CNM

## 2020-02-04 NOTE — PROGRESS NOTES
Blood pressure 112/69, pulse 92, temperature 97.7  F (36.5  C), temperature source Oral, resp. rate 16, last menstrual period 04/27/2019, not currently breastfeeding.  Patient Vitals for the past 24 hrs:   BP Temp Temp src Pulse Resp   02/04/20 0200 112/69 97.7  F (36.5  C) Oral 92 16   02/04/20 0100 -- 98.2  F (36.8  C) Oral -- --   02/04/20 0000 106/72 98.6  F (37  C) Oral 62 14   02/03/20 2255 99/67 98.7  F (37.1  C) Oral -- 16   02/03/20 2045 -- 97.9  F (36.6  C) Oral -- --   02/03/20 1930 108/64 98.7  F (37.1  C) Oral -- 16   02/03/20 1700 -- 97.7  F (36.5  C) Oral -- --   02/03/20 1500 -- 97.9  F (36.6  C) Oral -- --   02/03/20 1300 112/73 98  F (36.7  C) Oral 74 16     General appearance: uncomfortable with contractions but good support from  and   Breathing with contr. Declined for pitocin to be increased to 4mu/min. Contractions q 2-4 and palpate moderate. Having small bloody show.  CONTACTIONS: moderate, cramping and every 2-4 minutes  Pitocin- 2 mu/min.,  Antibiotics- none  FETAL HEART TONES: continuous EFM- baseline 135 with moderate variability and positive accelerations. No decelerations.  ROM: clear fluid  PELVIC EXAM:deferred  # Pain Assessment:  Current Pain Score 2/4/2020   Patient currently in pain? yes   Pain descriptors Cramping   - Shavonne is experiencing pain due to labor. Pain management was discussed with Shavonne and her spouse and the plan was created in a collaborative fashion.  Shavonne's response to the current recommendations: engaged  - planning unmedicated birth, comfort measures    ASSESSMENT:  ==============  IUP @ 40w3d SROM x 19 hours and for induction of labor.  Indication: SROM without labor   AMA  Fetal Heart Rate Tracing category one  GBS- negative  Patient Active Problem List   Diagnosis     History of abnormal cervical Pap smear     Supervision of primigravida of advanced maternal age, WHS CNM     Myopia     ADD (attention deficit disorder) without hyperactivity      Encounter for triage in pregnant patient     Labor and delivery, indication for care     PLAN:  ===========  Encourage rest  Comfort measures  Observation and reevaluate in 1-2 hours prn  Continue labor induction with Pitocin   Erica Melton, JA TAYLORM

## 2020-02-04 NOTE — PROGRESS NOTES
Shavonne would like the option of getting in the tub during labor. Hot water is not working in room 442. Room change to 477.

## 2020-02-04 NOTE — PROGRESS NOTES
OBGYN Attending Progress Note    I was asked by Shireen Jensen CNM to assess Shvaonne's pushing. Unfortunately, Shavonne has been pushing for 3 hours with minimal descent. FHT currently category 2, with 150/mod/+accels/recurrent variable decels.     On my exam, excellent maternal pushing effort, fetal head at -1 station, OA, caput. Significant anterior vaginal edema.     I spoke with Shavonne and her partner that at this station I would not recommend operative vaginal delivery, but did recommend  given the lack of descent. Discussed risks of surgery including DVT/PE, hemorrhage, transfusion, infection, injury to bowel/bladder/ureters/nerves/vessels requiring repair/reoperation for repair. Shavonne is in agreement. Consent signed.    Will give ancef and azithromycin for prophylaxis. Anesthesia updated.    Yissel Singh MD, MSCI    [facility]/OBGYN

## 2020-02-04 NOTE — CONSULTS
OB Consult    Shavonne Vogel is a 40 year old  at 40w3d who was admitted to the [facility] service for SROM on 2/3/20 at 0800. She received Pitocin for labor augmentation. She had an intermittent category II FHT throughout the day, however progressed to complete dilation at 1415. She has pushed with good effort for >3 hours with no descent past 0 station.     Pregnancy complicated by  - AMA    SVE: Fetal head at 0 station with 2 cm of caput. Edematous anterior vagina at the introitus     Baseline 150's, moderate variability, +accels, recurrent variable decels    [place]: q3 min ctx    A/P:   at 40w3d who now meets criteria for arrest of descent. Discussed with patient our recommendation to proceed with  section given no descent past 0 station and category II FHT with recurrent variable decels. Fetal station is not low enough to offer operative vaginal delivery. Patient and her  are agreeable to proceeding with  section. Risks including bleeding, infection, and damage to surrounding structures including bladder, bowel, blood vessels, and baby were discussed. Written consent obtained. Will proceed urgently with c/s. Ancef and Azithromycin ordered for pre-op abx. Anesthesia aware.     Patient seen with Dr. Singh.    Rachele Gold MD  Ob/Gyn PGY-3  20 5:57 PM

## 2020-02-04 NOTE — PLAN OF CARE
Frequent position changes. Hands and knees, side lying release, and turning to the left and right. Pt requesting nitrous for pain. Requesting more pain medication. ANGELA Jensen at bedside discussing fentanyl v epidural. Fentanyl given at 0835. Pt expressed decrease in labor pains. Currently not interested in an epidural.

## 2020-02-04 NOTE — PLAN OF CARE
Began pushing at 1415. Shavonne has changed position while pushing many times. Good maternal effort. Bladder emptied. O2 on.

## 2020-02-04 NOTE — PROGRESS NOTES
S: I was notified by present at bedside with RN during persistent category II fetal heart rate tracing for 120 minutes.  Strip reviewed at bedside.  Also reviewed FHT with and updated Dr. Roshni Saul regarding labor status.     O: Baseline rate 130, normal  Variability moderate, had period of minimal variability after Fentanyl   Accelerations not present  Decelerations present, deceleration type: variable, deceleration frequency: intermittent    Assessment: EFM interpretation suggests absence of concern for fetal metabolic acidemia at this time due to moderate variability    Uterine Activity normal/regular.  The interventions currently taken to improve the fetal heart rate tracing and fetal oxygenation are: maternal positioning, IV fluid bolus  and sterile vaginal exam    A: Persistent category II tracing.    P: Per the category II algorithm, the plan is to continue observe/conservative management. Reevaluate in 60 minutes. Continue to observe for minimal variability and significant recurrent decelerations. Update Dr. Saul if C/S if indicated per Cat II FHT Algorithm.

## 2020-02-05 LAB
HGB BLD-MCNC: 8.7 G/DL (ref 11.7–15.7)
HGB BLD-MCNC: 9.6 G/DL (ref 11.7–15.7)

## 2020-02-05 PROCEDURE — 25000128 H RX IP 250 OP 636: Performed by: STUDENT IN AN ORGANIZED HEALTH CARE EDUCATION/TRAINING PROGRAM

## 2020-02-05 PROCEDURE — 36415 COLL VENOUS BLD VENIPUNCTURE: CPT | Performed by: OBSTETRICS & GYNECOLOGY

## 2020-02-05 PROCEDURE — 36415 COLL VENOUS BLD VENIPUNCTURE: CPT | Performed by: STUDENT IN AN ORGANIZED HEALTH CARE EDUCATION/TRAINING PROGRAM

## 2020-02-05 PROCEDURE — 12000001 ZZH R&B MED SURG/OB UMMC

## 2020-02-05 PROCEDURE — 25000132 ZZH RX MED GY IP 250 OP 250 PS 637: Performed by: STUDENT IN AN ORGANIZED HEALTH CARE EDUCATION/TRAINING PROGRAM

## 2020-02-05 PROCEDURE — 85018 HEMOGLOBIN: CPT | Performed by: OBSTETRICS & GYNECOLOGY

## 2020-02-05 PROCEDURE — 85018 HEMOGLOBIN: CPT | Performed by: STUDENT IN AN ORGANIZED HEALTH CARE EDUCATION/TRAINING PROGRAM

## 2020-02-05 RX ADMIN — ACETAMINOPHEN 975 MG: 325 TABLET, FILM COATED ORAL at 15:48

## 2020-02-05 RX ADMIN — KETOROLAC TROMETHAMINE 30 MG: 30 INJECTION, SOLUTION INTRAMUSCULAR at 08:36

## 2020-02-05 RX ADMIN — OXYCODONE HYDROCHLORIDE 5 MG: 5 TABLET ORAL at 12:05

## 2020-02-05 RX ADMIN — SODIUM CHLORIDE, SODIUM LACTATE, POTASSIUM CHLORIDE, CALCIUM CHLORIDE, AND DEXTROSE MONOHYDRATE: 600; 310; 30; 20; 5 INJECTION, SOLUTION INTRAVENOUS at 02:30

## 2020-02-05 RX ADMIN — KETOROLAC TROMETHAMINE 30 MG: 30 INJECTION, SOLUTION INTRAMUSCULAR at 02:03

## 2020-02-05 RX ADMIN — ACETAMINOPHEN 975 MG: 325 TABLET, FILM COATED ORAL at 08:36

## 2020-02-05 RX ADMIN — SIMETHICONE CHEW TAB 80 MG 80 MG: 80 TABLET ORAL at 15:48

## 2020-02-05 RX ADMIN — IBUPROFEN 800 MG: 800 TABLET, FILM COATED ORAL at 22:33

## 2020-02-05 RX ADMIN — KETOROLAC TROMETHAMINE 30 MG: 30 INJECTION, SOLUTION INTRAMUSCULAR at 15:48

## 2020-02-05 NOTE — ANESTHESIA PROCEDURE NOTES
Peripheral Nerve Block Procedure Note    Staff:     Anesthesiologist:  Kassandra Bañuelos MD    Resident/CRNA:  Ahsan Jean MD  Location: OR AFTER induction  Procedure Start/Stop TImes:     patient identified, IV checked, site marked, risks and benefits discussed, informed consent, monitors and equipment checked, pre-op evaluation, at physician/surgeon's request and post-op pain management      Correct Patient: Yes      Correct Position: Yes      Correct Site: Yes      Correct Procedure: Yes      Correct Laterality:  Yes    Site Marked:  Yes  Procedure details:     Procedure:  TAP    ASA:  3    Laterality:  Bilateral    Position:  Supine    Needle length (mm):  110    Ultrasound: Yes      Ultrasound used to identify targeted nerve, plexus, or vascular structure and placed a needle adjacent to it      Permanent Image entered into patiient's record      Blood Aspirated: No      Paresthesias:  No    Bleeding at site: No      Bolus via:  Needle    Infusion Method:  Single Shot    Blood aspirated via catheter: No    Assessment/Narrative:     Injection made incrementally with aspirations every (mL):  5

## 2020-02-05 NOTE — ANESTHESIA CARE TRANSFER NOTE
Patient: Shavonne Vogel    Procedure(s):   SECTION    Diagnosis:  delivery w/o mention of indication, hi [facility] [O82]  Diagnosis Additional Information: No value filed.    Anesthesia Type:   No value filed.     Note:  Airway :Room Air  Patient transferred to:PACU  Comments: Patient is Awake, VSS, following commands. Patient is breathing Spontaneously on room air . Care report given to PACU RN, and notified of assigned Anesthesiology staff to patient for continuity of PACU care.     Ahsan Jean M.D.  Anesthesiology Resident CA-2, PGY-3  Pager [phone]  Handoff Report: Identifed the Patient, Identified the Reponsible Provider, Reviewed the pertinent medical history, Discussed the surgical course, Reviewed Intra-OP anesthesia mangement and issues during anesthesia, Set expectations for post-procedure period and Allowed opportunity for questions and acknowledgement of understanding      Vitals: (Last set prior to Anesthesia Care Transfer)    CRNA VITALS  2020 1917 - 2020 1949      2020             Pulse:  86    Ht Rate:  87    SpO2:  95 %                Electronically Signed By: Ahsan Jean MD  2020  7:49 PM

## 2020-02-05 NOTE — DISCHARGE SUMMARY
[facility] Discharge Summary    Shavonne Vogel MRN# [id number]   Age: 40 year old YOB: 1980     Date of Admission:  2/3/2020  Date of Discharge:  2020  Admitting Physician:  JA Watkins CNM  Discharge Physician:  Arelis Franklin MD    Admit Dx:   - Intrauterine pregnancy at 40w2d  - SROM    Discharge Dx:  - Same as above, s/p primary low transverse  section at 40w3d for arrest of descent    Procedures:  - Primary low transverse  section with double layer uterine closure via Pfannenstiel incision  - Epidural analgesia  - TAP block    Admit HPI:  Shavonne Vogel presented to [facility] clinic for routine prenatal appointment this morning.  Upon membrane sweep, watery pink tinged fluid was noticed by CNM. Pt was sent to  for confirmation of SROM. Pt reports she noticed leakage first this morning at 0800. She denies any headache, vision changes, vaginal bleeding. Reports baby has been active.   Pt AMA and has been having BPPs. Last today with cephalic presentation and BPP 8/8.  Please see her admit H&P for full details of her PMH, PSH, Meds, Allergies and exam on admit.    Operative Course:  Surgery was uncomplicated. QBL from the delivery was 745 mL. Please see her  Section Operative Note for full details regarding her delivery.    Operative Findings:   1. No rectofascial or intraabdominal adhesions.   2. Clear amniotic fluid  3. Liveborn male infant in JAMAAL presentation. Apgars 9 at 1 minute & 9 at 5 minutes. Weight 3430 g.  4. Normal uterus, fallopian tubes, and ovaries.     Postoperative Course:  Her postoperative course was uncomplicated. On POD#3, she was meeting all of her postpartum goals and deemed stable for discharge. She was voiding without difficulty, tolerating a regular diet without nausea and vomiting, her pain was well controlled on oral pain medicines and her lochia was appropriate. Her hemoglobin prior to delivery was 12.8  and after delivery was 9.6. Her Rh status was positive and Rhogam was not= indicated.    Discharge Medications:     Review of your medicines      START taking      Dose / Directions   acetaminophen 325 MG tablet  Commonly known as:  TYLENOL      Dose:  650 mg  Take 2 tablets (650 mg) by mouth every 6 hours as needed for mild pain Start after Delivery.  Quantity:  100 tablet  Refills:  0     ferrous gluconate 324 (38 Fe) MG tablet  Commonly known as:  FERGON  Used for:  Anemia due to blood loss, acute      Dose:  324 mg  Take 1 tablet (324 mg) by mouth daily (with breakfast)  Quantity:  30 tablet  Refills:  3     ibuprofen 600 MG tablet  Commonly known as:  ADVIL/MOTRIN      Dose:  600 mg  Take 1 tablet (600 mg) by mouth every 6 hours as needed for moderate pain Start after delivery  Quantity:  60 tablet  Refills:  0     oxyCODONE 5 MG tablet  Commonly known as:  ROXICODONE      Dose:  5-10 mg  Take 1-2 tablets (5-10 mg) by mouth every 6 hours as needed for pain  Quantity:  3 tablet  Refills:  0     senna-docusate 8.6-50 MG tablet  Commonly known as:  SENOKOT-S/PERICOLACE      Dose:  1 tablet  Take 1 tablet by mouth daily Start after delivery.  Quantity:  100 tablet  Refills:  0        CONTINUE these medicines which have NOT CHANGED      Dose / Directions   breast pump Misc  Used for:  Lactation disorder, antepartum      Dose:  1 each  1 each as needed (need to express breast milk)  Quantity:  1 each  Refills:  0     PRENATAL VITAMINS PO      Refills:  0           Where to get your medicines      These medications were sent to [facility] [address]  [address] [address]    Phone:  [phone]     acetaminophen 325 MG tablet    ferrous gluconate 324 (38 Fe) MG tablet    ibuprofen 600 MG tablet    senna-docusate 8.6-50 MG tablet     Some of these will need a paper prescription and others can be bought over the counter. Ask your nurse if you have  questions.    Bring a paper prescription for each of these medications    oxyCODONE 5 MG tablet           Discharge/Disposition:  Shavonne Vogel was discharged to home in stable condition with the following instructions/medications:  1) Call for temperature > 100.4, bright red vaginal bleeding >1 pad an hour x 2 hours, foul smelling vaginal discharge, pain not controlled by usual oral pain meds, persistent nausea and vomiting not controlled on medications, drainage or redness from incision site  2) She desired COCs for contraception at 6 weeks.  3) For feeding she decided to breastfeed.  4) She was instructed to follow-up with her primary OB in 6 weeks for a routine postpartum visit.  5) Discharge activity:  No heavy lifting >15 lbs or strenuous activity for 6 weeks, pelvic rest for 6 weeks, no driving or operating machinery while on narcotics.    Mishel Young MD  OB/GYN PGY-2  02/07/20 7:00 AM     [facility] staff:  Appreciate note by Dr. Young.  I have seen and examined the patient without the resident. I have reviewed, edited, and agree with the note.        Arelis Franklin MD, FACOG  2/7/2020  9:39 AM

## 2020-02-05 NOTE — LACTATION NOTE
This note was copied from a baby's chart.  Consult for: First time breastfeeding     Delivery Information: Baby Phoenix was born at 40.3 weeks via c/s for arrest of descent last evening at 1842.     Maternal Health History: Shavonne has a history of ADD and AMA (40 years old).?    Maternal Breast Exam: Shavonne noted breast growth in pregnancy. Her breasts are soft and symmetrical with bilateral intact, everted nipples. She has been able to hand express colostrum. ?    Infant information:  Baby Phoenix has stooled but has not yet voided per mom. He has been cueing regularly. His birthweight was 7lb 9oz and he is <24 hours old.?    Oral exam of baby: deferred as infant breastfeeding. Shavonne denies discomfort.     Feeding Assessment: Shavonne had Phoenix latched in a modified cradle hold. She denied discomfort and he appeared to have a deep, asymmetric latch. He was heard swallowing at the breast.  Shavonne has been hand expressing colostrum.    Education: early feeding cues, benefits of feeding on cue, breastfeeding positions, signs breastfeeding is going well (comfortable latch, age appropriate output and weight loss, swallowing heard at the breast), satiety cues, expected  output,  weight loss, nutritive vs non-nutritive sucking, benefits of skin to skin, the Second Night, benefits of breast massage and hand expression of colostrum, inpatient lactation support and outpatient lactation resources.      Plan: Continue breastfeeding on cue with RN support as needed with a goal of 8-12 feedings per day. Encourage frequent skin to skin, breast massage and hand expression. Due to risk factors (AMA,  delivery) encourage skin to skin after each feeding. If feeding/supply concerns at 24 hours consider adding pumping 4-6 times in addition to hand expression.     Shavonne plans to follow up at [facility]. She will check with clinic for LC availability. She has the [facility] Breastfeeding Resource List as well  if needed.

## 2020-02-05 NOTE — OP NOTE
[facility]  Full Operative Progress Note     Surgery Date:   2020     Surgeon:           Yissel Singh MD     Assistants:       Mishel Young MD, PGY-2                            Amy Schumer, MD, PGY3     Pre-op Diagnosis:         1. Intrauterine pregnancy at 40w3d                                      2. Arrest of descent     3. AMA                                  Post-op Diagnosis:       1. Same                                          2. Liveborn male infant      Procedure:        Primary low-transverse  section with double layer uterine closure via Pfannenstiel incision     Anesthesia:      Epidural     QBL:     745 mL     IVF:       800 mL crystalloid     UOP:    500 mL clear urine at the end of the case     Drains: Manuel Catheter      Specimens:      Cord blood     Complications:             None apparent    Indications:   Shavonne Vogel is a 40 year old  at 40w3d admitted to [facility] service for SROM. She received pitocin for labor augmentation and progressed to complete. FHT was intermittently cat II throughout labor course. She pushed for three hours with minimal descent, with good effort and no descent past 0 station. The risks, benefits, and alternatives of  section were discussed with the patient, and she agreed to proceed.     Findings:   Findings:   1. No rectofascial or intraabdominal adhesions.   2. Clear amniotic fluid  3. Liveborn male infant in JAMAAL presentation. Apgars 9 at 1 minute & 9 at 5 minutes. Weight 3430 g.  4. Normal uterus, fallopian tubes, and ovaries.     Procedure Details:   The patient was brought to the OR, where adequate epidural anesthesia was confirmed.  She was placed in the dorsal lithotomy position with a slight leftward tilt. She was prepped and draped in the usual sterile fashion. A surgical time out was performed. A pfannenstiel skin incision was made with the scalpel, and carried down to the underlying fascia with sharp and  blunt dissection. The fascia was incised in the midline, and the incision was extended laterally with blunt dissection. The superior aspect of the fascia was grasped with the Kocher clamps and dissected off of the underlying rectus muscles with blunt and sharp dissection. Attention was then turned to the inferior aspect of the fascia, which was similarly dissected off of the underlying rectus muscles. The rectus muscles were  in the midline, and the peritoneum was entered bluntly, and the opening was extended with digital pressure. The bladder blade was placed.  A transverse hysterotomy was made with the scalpel in the lower uterine segment, and the incision was extended with digital pressure. The infant was noted to be in the JAMAAL position, and was delivered atraumatically. The shoulders delivered easily.  No nuchal cord was noted. The cord was doubly clamped and cut, and the infant was handed off to the awaiting nursery staff. A segment of cord was cut and reserved as needed. The placenta was delivered with gentle traction on the umbilical cord and uterine massage. The uterus was cleared of all clots and debris. Uterine tone was noted to be good with pitocin given through the running IV and uterine massage.  The hysterotomy was closed with a running locked suture of 0 Vicryl.  The hysterotomy was then imbricated using an 0 Vicryl suture. The hysterotomy was noted to be hemostatic. The pericolic gutters were cleared of all clots and debris. The hysterotomy was reexamined and noted to be hemostatic. The fascia and rectus muscles were examined and areas of oozing were controlled with electrocautery. The fascia was closed with a running 0 Vicryl suture. The subcutaneous tissue was irrigated and areas of oozing were controlled with electrocautery. The subcutaneous tissue was less than 2 cm in thickness, and was therefore not closed. The skin was closed with 4-0 Monocryl and covered with a sterile  dressing.    All sponge, needle, and instrument counts were correct. The patient tolerated the procedure well, and was transferred to recovery in stable condition. Dr. Singh was present and scrubbed for the entirety of the procedure.     Mishel Young MD  OB/GYN PGY-2  02/04/20 7:35 PM

## 2020-02-05 NOTE — ANESTHESIA PREPROCEDURE EVALUATION
Anesthesia Pre-Procedure Evaluation    Patient: Shavonne Vogel   MRN:     [id number] Gender:   female   Age:    40 year old :      1980        Preoperative Diagnosis:  delivery w/o mention of indication, hi lopez [O82]   Procedure(s):   SECTION     Past Medical History:   Diagnosis Date     Abnormal Pap smear of cervix      Hx of colposcopy with cervical biopsy       Past Surgical History:   Procedure Laterality Date     NO HISTORY OF SURGERY            Anesthesia Evaluation     .             ROS/MED HX    ENT/Pulmonary:  - neg pulmonary ROS     Neurologic: Comment: ADD     (-) seizures   Cardiovascular:  - neg cardiovascular ROS      (-) hypertension   METS/Exercise Tolerance:  >4 METS   Hematologic:  - neg hematologic  ROS      (-) anemia   Musculoskeletal:  - neg musculoskeletal ROS       GI/Hepatic: Comment: Currently naueated    (+) GERD      (-) liver disease   Renal/Genitourinary:  - ROS Renal section negative       Endo:  - neg endo ROS    (-) Type II DM   Psychiatric:         Infectious Disease:  - neg infectious disease ROS       Malignancy:      - no malignancy   Other: Comment: C/s for failure to descend and cat II tracing.    (+) Possibly pregnant                        PHYSICAL EXAM:   Mental Status/Neuro: A/A/O   Airway: Facies: Feasible  Mallampati: III  Mouth/Opening: Full  TM distance: > 6 cm  Neck ROM: Full   Respiratory: Auscultation: CTAB     Resp. Rate: Normal     Resp. Effort: Normal      CV: Rhythm: Regular  Rate: Age appropriate  Heart: Normal Sounds  Edema: None   Comments:      Dental: Normal Dentition                LABS:  CBC:   Lab Results   Component Value Date    WBC 8.8 2020    WBC 9.1 2020    HGB 12.8 2020    HGB 12.1 2020    HCT 36.9 2020    HCT 35.7 2020     2020     2020     BMP: No results found for: NA, POTASSIUM, CHLORIDE, CO2, BUN, CR, GLC  COAGS: No results found for: PTT, INR,  FIBR  POC:   Lab Results   Component Value Date    HCG  07/18/2009     Negative   This test provides a presumptive diagnosis of pregnancy. A confirmed pregnancy   diagnosis should only be made by a physician after all clinical and laboratory   findings have been evaluated.     OTHER:   Lab Results   Component Value Date    SED 4 07/18/2009        Preop Vitals    BP Readings from Last 3 Encounters:   02/04/20 96/41   02/03/20 99/73   01/31/20 107/72    Pulse Readings from Last 3 Encounters:   02/04/20 88   02/03/20 86   01/31/20 85      Resp Readings from Last 3 Encounters:   02/04/20 16   12/31/19 18    SpO2 Readings from Last 3 Encounters:   02/04/20 96%   12/30/19 97%   07/12/09 100%      Temp Readings from Last 1 Encounters:   02/04/20 37.1  C (98.7  F) (Oral)    Ht Readings from Last 1 Encounters:   02/03/20 1.524 m (5')      Wt Readings from Last 1 Encounters:   02/03/20 64.2 kg (141 lb 9.6 oz)    Estimated body mass index is 27.65 kg/m  as calculated from the following:    Height as of an earlier encounter on 2/3/20: 1.524 m (5').    Weight as of an earlier encounter on 2/3/20: 64.2 kg (141 lb 9.6 oz).     LDA:  Peripheral IV 02/03/20 Right Lower forearm (Active)   Site Assessment WDL 2/4/2020  7:50 PM   Line Status Infusing 2/4/2020  7:50 PM   Phlebitis Scale 0-->no symptoms 2/4/2020  7:50 PM   Infiltration Scale 0 2/4/2020  7:50 PM   Dressing Intervention Dressing reinforced 2/4/2020  5:17 AM   Number of days: 1       Urethral Catheter Latex 14 fr (Active)   Number of days: 0        Assessment:   ASA SCORE: 2 emergent   H&P: History and physical reviewed and following examination; no interval change.   Smoking Status:  Non-Smoker/Unknown   NPO Status: ELEVATED Aspiration Risk/Unknown     Plan:   Anes. Type:  MAC; Peripheral Nerve Block; For Post-op pain in coordination with surgeon; Epidural     Epidural Details:  Catheter; Lumbar     Block Details: TAP; Exparel; Single Shot   Pre-Medication: None    Induction:  N/a   Airway: Native Airway   Access/Monitoring: PIV   Maintenance: N/a     Postop Plan:   Postop Pain: Regional  Postop Sedation/Airway: Not planned  Disposition: Inpatient/Admit     PONV Management:   Adult Risk Factors: Female, Non-Smoker   Prevention: Ondansetron, Dexamethasone     CONSENT: Direct conversation   Plan and risks discussed with: Patient   Blood Products: Consented (ALL Blood Products)       Comments for Plan/Consent:                     Kassandra Gutierrez MD

## 2020-02-05 NOTE — PLAN OF CARE
Transfer to OR & C/S Delivery Note  Patient to OR at 1814 via cart.   Delivered viable Male via   section by Dr. Singh.  To warmer, stimulated and dried by NICU.    Brought to mother after bundling for bonding.

## 2020-02-05 NOTE — PLAN OF CARE
VSS. Patient has not gotten out of bed yet. Is comfortable with scheduled Toradol and Tylenol. Fundus is firm, with scant bleeding. Breastfeeding and hand expressing colostrum to feed to infant. No concerns at this time, continue with the current plan of care.

## 2020-02-05 NOTE — PLAN OF CARE
Data: Shavonne Vogel transferred to 7144 viacart at 2225. Baby transferred via parent's arms.  Action: Receiving unit notified of transfer: Yes. Patient and family notified of room change. Report given to Marilee SHEPPARD at 2150. Belongings sent to receiving unit. Accompanied by Registered Nurse. Oriented patient to surroundings. Call light within reach. ID bands double-checked with receiving RN. Bands changed due one band missing on baby #[id number]  Response: Patient tolerated transfer and is stable.

## 2020-02-05 NOTE — PLAN OF CARE
Patient arrived to [facility] unit via zoom cart at 2245,with belongings, accompanied by spouse/ significant other, with infant in arms. Baby was missing one band upon admission so baby, mom, and dad were all rebanded. New band number [id number].  Unit and room orientation started. Call light given and within arms reach; no concerns present at this time. Continue with plan of care.    Marilee DUARTE RN is primary nurse for patient. This RN helped with transfer and admission.

## 2020-02-05 NOTE — PROVIDER NOTIFICATION
20 1744   Provider Notification   Provider Name/Title Dr Singh   Method of Notification At Bedside   Request Evaluate in Person   Dr Singh at bedside to do SVE, assess maternal pushing effort. Discussion to have a  begun. Dr Singh consenting patient for , anesthesia at bedside.

## 2020-02-05 NOTE — BRIEF OP NOTE
[facility]   Brief Operative Note     Surgery Date: 2020    Surgeon:  Yissel Singh MD    Assistants:  Mishel Young MD, PGY-2    Amy Schumer, MD, PGY3    Pre-op Diagnosis:  1. Intrauterine pregnancy at 40w3d     2. Arrest of descent         Post-op Diagnosis:  1. Same      2. Liveborn male infant     Procedure:  Primary low-transverse  section with double layer uterine closure via Pfannenstiel incision    Anesthesia: Epidural    QBL:  745 mL    IVF:  800 mL crystalloid    UOP:  500 mL clear urine at the end of the case    Drains: Manuel Catheter     Specimens:  Cord blood    Complications: None apparent    Findings:   1. No rectofascial or intraabdominal adhesions.   2. Clear amniotic fluid  3. Liveborn male infant in JAMAAL presentation. Apgars 9 at 1 minute & 9 at 5 minutes. Weight pending.  4. Normal uterus, fallopian tubes, and ovaries.     Disposition:  Transferred in stable condition to [facility]    Mishel Young MD  OB/GYN Resident, PGY-2  2020 7:33 PM

## 2020-02-05 NOTE — PROGRESS NOTES
[facility]   Post-partum Note    Name:  Shavonne Vogel  MRN: [id number]    S: Pain well controlled.  Tolerating clear liquid diet without nausea or vomiting. Passing flatus, no BM.  Not ambulating at this time. No dizziness, headache, nausea or vomiting reported.  Lochia clear.  Breastfeeding.  Plans discharge in 1-2 days.    O:   Patient Vitals for the past 24 hrs:   BP Temp Temp src Pulse Resp SpO2   02/04/20 1815 117/71 -- -- -- -- --   02/04/20 1650 -- 99.4  F (37.4  C) Oral -- -- --   02/04/20 1635 121/75 -- -- -- -- 99 %   02/04/20 1530 124/67 -- -- -- -- 100 %   02/04/20 1526 -- 98.3  F (36.8  C) Oral -- -- --   02/04/20 1520 -- 99.9  F (37.7  C) Axillary -- -- --   02/04/20 1505 119/65 -- -- -- -- 100 %   02/04/20 1430 108/60 98.5  F (36.9  C) -- -- -- 100 %   02/04/20 1405 109/77 -- -- -- -- 100 %   02/04/20 1325 130/76 -- -- -- -- 100 %   02/04/20 1320 124/83 -- -- -- -- 100 %   02/04/20 1315 113/74 -- -- -- -- 100 %   02/04/20 1310 122/75 -- -- -- -- 100 %   02/04/20 1305 119/75 -- -- -- -- 100 %   02/04/20 1300 124/76 -- -- -- -- 100 %   02/04/20 1255 123/79 -- -- -- -- 100 %   02/04/20 1250 125/81 -- -- -- -- 100 %   02/04/20 1245 114/76 -- -- -- -- 100 %   02/04/20 1240 117/68 -- -- -- -- 98 %   02/04/20 1230 109/67 -- -- -- -- 98 %   02/04/20 1225 114/65 -- -- -- -- 97 %   02/04/20 1220 118/75 -- -- -- -- 98 %   02/04/20 1217 110/73 -- -- -- -- 98 %   02/04/20 1215 112/77 -- -- -- -- --   02/04/20 1213 114/77 -- -- -- -- --   02/04/20 1210 119/80 -- -- -- -- 98 %   02/04/20 1208 118/78 -- -- -- -- --   02/04/20 1207 113/74 98.6  F (37  C) Axillary -- -- 98 %   02/04/20 1203 112/74 -- -- -- -- --   02/04/20 1201 116/73 -- -- -- -- --   02/04/20 1159 116/75 -- -- -- -- --   02/04/20 1157 111/69 -- -- -- -- 99 %   02/04/20 1155 109/74 -- -- -- -- 99 %   02/04/20 1148 117/77 -- -- -- -- 99 %   02/04/20 1124 118/84 98  F (36.7  C) Oral -- -- --   02/04/20 0950 115/67 98.3  F (36.8   C) Oral 103 22 --   20 0739 98/69 97.9  F (36.6  C) Oral 89 22 --   20 0604 119/62 98.5  F (36.9  C) Oral 94 26 --   20 0500 -- 97.9  F (36.6  C) Oral -- -- --   20 0357 123/76 98.3  F (36.8  C) Oral 83 18 --   20 0300 -- 98  F (36.7  C) Oral -- 18 --   20 0200 112/69 97.7  F (36.5  C) Oral 92 16 --   20 0100 -- 98.2  F (36.8  C) Oral -- -- --   20 0000 106/72 98.6  F (37  C) Oral 62 14 --   20 2255 99/67 98.7  F (37.1  C) Oral -- 16 --   20 2045 -- 97.9  F (36.6  C) Oral -- -- --     Gen:  Resting comfortably, NAD  CV:  RRR, no murmur  Pulm:  CTAB, no wheezes  Abd:  Soft, appropriately ttp, non-distended. Fundus at umbilicus, firm and non-tender. Clean bandage in place over incision.   Ext:  non-tender, LE edema b/l    I/O last 3 completed shifts:  In: 4037.03 [P.O.:2000; I.V.:1037.03; IV Piggyback:1000]  Out: 3750 [Urine:2750; Emesis/NG output:1000]    Hgb:   Hemoglobin   Date Value Ref Range Status   2020 12.8 11.7 - 15.7 g/dL Final       Assessment/Plan:  40 year old  on POD #1 s/p PLTCS for arrest of descent.  - continue with routine postpartum management  Pain: Well-controlled with Toradol and tylenol, prn oxycodone. Transition to ibuprofen today.   Hgb: 12.8> >8.7. Will discharge home with oral iron. Plan to recheck this afternoon, currently asymptomatic but has not been out of bed.  Rh: pos  Rubella: imm  Feed: breast  BC: Progesterone only or combined OCP at 6w visit  Dispo: DC POD#2-3    Mike Duarte, MS3    I was present with the medical student who participated in the service and in the documentation of this note.  I have verified the history and personally performed the physical exam and medical decision making, and have verified the content of the note, which accurately reflect my assessment of the patient and the plan of care.    Mishel Young MD  Ob/Gyn, PGY-2    Staff MD Note    I appreciate the note by Dr. Young.   Any necessary changes have been made by me.  I saw and evaluated the patient and agree with the findings and plan of care as documented in the note.    Sri Buck MD

## 2020-02-05 NOTE — PLAN OF CARE
Vital signs and assessments WDL. Up with SBA. Manuel draining clear urine. Pain managed with oxycodone, tylenol, and toradol. Breastfeeding independently. Positive bonding observed.

## 2020-02-06 VITALS
SYSTOLIC BLOOD PRESSURE: 113 MMHG | HEART RATE: 75 BPM | OXYGEN SATURATION: 98 % | TEMPERATURE: 97.6 F | DIASTOLIC BLOOD PRESSURE: 76 MMHG | RESPIRATION RATE: 16 BRPM

## 2020-02-06 PROCEDURE — 12000001 ZZH R&B MED SURG/OB UMMC

## 2020-02-06 PROCEDURE — 25000132 ZZH RX MED GY IP 250 OP 250 PS 637: Performed by: STUDENT IN AN ORGANIZED HEALTH CARE EDUCATION/TRAINING PROGRAM

## 2020-02-06 RX ORDER — ACETAMINOPHEN 325 MG/1
650 TABLET ORAL EVERY 6 HOURS PRN
Qty: 100 TABLET | Refills: 0 | Status: SHIPPED | OUTPATIENT
Start: 2020-02-06

## 2020-02-06 RX ORDER — AMOXICILLIN 250 MG
1 CAPSULE ORAL DAILY
Qty: 100 TABLET | Refills: 0 | Status: SHIPPED | OUTPATIENT
Start: 2020-02-06

## 2020-02-06 RX ORDER — OXYCODONE HYDROCHLORIDE 5 MG/1
5-10 TABLET ORAL EVERY 6 HOURS PRN
Qty: 3 TABLET | Refills: 0 | Status: SHIPPED | OUTPATIENT
Start: 2020-02-06

## 2020-02-06 RX ORDER — IBUPROFEN 600 MG/1
600 TABLET, FILM COATED ORAL EVERY 6 HOURS PRN
Qty: 60 TABLET | Refills: 0 | Status: SHIPPED | OUTPATIENT
Start: 2020-02-06

## 2020-02-06 RX ORDER — FERROUS GLUCONATE 324(38)MG
324 TABLET ORAL
Qty: 30 TABLET | Refills: 3 | Status: SHIPPED | OUTPATIENT
Start: 2020-02-06

## 2020-02-06 RX ADMIN — ACETAMINOPHEN 975 MG: 325 TABLET, FILM COATED ORAL at 17:07

## 2020-02-06 RX ADMIN — SENNOSIDES AND DOCUSATE SODIUM 2 TABLET: 8.6; 5 TABLET ORAL at 19:53

## 2020-02-06 RX ADMIN — SIMETHICONE CHEW TAB 80 MG 80 MG: 80 TABLET ORAL at 17:07

## 2020-02-06 RX ADMIN — ACETAMINOPHEN 975 MG: 325 TABLET, FILM COATED ORAL at 01:15

## 2020-02-06 RX ADMIN — ACETAMINOPHEN 975 MG: 325 TABLET, FILM COATED ORAL at 09:27

## 2020-02-06 RX ADMIN — IBUPROFEN 800 MG: 800 TABLET, FILM COATED ORAL at 11:17

## 2020-02-06 RX ADMIN — POLYETHYLENE GLYCOL 3350 17 G: 17 POWDER, FOR SOLUTION ORAL at 09:26

## 2020-02-06 RX ADMIN — IBUPROFEN 800 MG: 800 TABLET, FILM COATED ORAL at 17:07

## 2020-02-06 RX ADMIN — IBUPROFEN 800 MG: 800 TABLET, FILM COATED ORAL at 05:06

## 2020-02-06 NOTE — PROGRESS NOTES
CNM Courtesy Note    Shavonne is resting in bed, comfortable.  She reports increased pain when up and moving.  Encouraged walking to move gas and decrease pain long term. Pain is controlled with Tylenol and Ibuprofen, but she is considering oxycodone x1 prior to walking the halls today.  She reports feeling constipated and has not started taking a stool softener yet.  Encouraged her to start that now. Baby is well and feeding is going well.  She is tired from cluster feeding all last night.    I, SARAHI Meza, am serving as a scribe to document services personally performed by Bryce Villegas CNM based on the provider's statements to me.- SARAHI Meza      The encounter was performed by me and scribed by the SNM. The scribed note accurately reflects my personal services and decisions made by me.      JA Alatorre CNM

## 2020-02-06 NOTE — PLAN OF CARE
Data: Vital signs within normal limits. Postpartum checks within normal limits - see flow record. Patient eating and drinking normally. Patient able to empty bladder independently and is up ambulating. Patient performing self cares and is able to care for infant.  Action: Patient medicated during the shift for incisional soreness.   Response: Positive attachment behaviors observed with infant.   Will continue to monitor and provide support.

## 2020-02-06 NOTE — PROGRESS NOTES
[facility]   Post-partum Note    Name:  Shavonne Vogel  MRN: [id number]    S: Pain well controlled.  Tolerating clear liquid diet without nausea or vomiting. Passing flatus, no BM.  Ambulating without dizziness at this time. No dizziness, headache, nausea or vomiting reported.  Lochia minimal.  Breastfeeding.  Plans discharge tomorrow.     O:   Patient Vitals for the past 24 hrs:   BP Temp Temp src Heart Rate Resp SpO2   20 0100 98/69 97.6  F (36.4  C) Oral 78 16 --   20 1800 92/61 97.6  F (36.4  C) Oral 68 16 --   20 1154 90/55 97.7  F (36.5  C) Oral -- -- 98 %   20 0846 94/58 97.5  F (36.4  C) Oral 73 18 97 %     Gen:  Resting comfortably, NAD  CV:  RRR, no murmur  Pulm:  CTAB, no wheezes  Abd:  Soft, appropriately ttp, non-distended. Fundus at umbilicus, firm and non-tender. Incision c/d/i.   Ext:  non-tender, LE edema b/l    Hgb:   Hemoglobin   Date Value Ref Range Status   2020 9.6 (L) 11.7 - 15.7 g/dL Final       Assessment/Plan:  40 year old  on POD #2 s/p PLTCS for arrest of descent.  - continue with routine postpartum management  Pain: Well-controlled with ibuprofen and tylenol, prn oxycodone.   Hgb: 12.8> >8.7>9.6. Will discharge home with oral iron.   Rh: pos  Rubella: imm  Feed: breast  BC: Progesterone only or combined OCP at 6w visit  Dispo: DC POD#3, per pt preference     Mishel Young MD  Ob/Gyn, PGY-2    I have seen and examined the patient with the resident. I have reviewed, edited, and agree with the note.   My findings are: My findings are: Appears well.   Abdomen: soft, NT, ND.   Incision CDI   LE without significant edema or erythema bilaterally  Hemoglobin   Date Value Ref Range Status   2020 9.6 (L) 11.7 - 15.7 g/dL Final   abdominal binder positioned too high  Debby Conrad MD

## 2020-02-06 NOTE — PLAN OF CARE
VSS and postpartum assessments WDL.  Up ad claudette with steady gait.  Independent with cares, showered this evening.  Manuel catheter removed and pt voiding without difficulty.  Bonding well with infant.  Breastfeeding on cue independently with good latch observed.  Provided minimal assist with using pillows for positioning.  Pain managed with tylenol and toradol/ibuprofen per MAR.  Incisional steristrips intact.  , Ahsan present and supportive.  Resting between cares and feedings.  Will continue with postpartum cares and education per plan of care.

## 2020-02-07 PROCEDURE — 25000132 ZZH RX MED GY IP 250 OP 250 PS 637: Performed by: STUDENT IN AN ORGANIZED HEALTH CARE EDUCATION/TRAINING PROGRAM

## 2020-02-07 RX ADMIN — POLYETHYLENE GLYCOL 3350 17 G: 17 POWDER, FOR SOLUTION ORAL at 08:19

## 2020-02-07 RX ADMIN — SENNOSIDES AND DOCUSATE SODIUM 2 TABLET: 8.6; 5 TABLET ORAL at 08:19

## 2020-02-07 RX ADMIN — ACETAMINOPHEN 975 MG: 325 TABLET, FILM COATED ORAL at 01:25

## 2020-02-07 RX ADMIN — ACETAMINOPHEN 975 MG: 325 TABLET, FILM COATED ORAL at 10:00

## 2020-02-07 RX ADMIN — IBUPROFEN 800 MG: 800 TABLET, FILM COATED ORAL at 06:50

## 2020-02-07 RX ADMIN — IBUPROFEN 800 MG: 800 TABLET, FILM COATED ORAL at 01:25

## 2020-02-07 NOTE — ANESTHESIA POSTPROCEDURE EVALUATION
Anesthesia POST Procedure Evaluation    Patient: Shavonne Vogel   MRN:     [id number] Gender:   female   Age:    40 year old :      1980        Preoperative Diagnosis:  delivery w/o mention of indication, delcolette, hi hospitaliz [O82]   Procedure(s):   SECTION   Postop Comments: No value filed.       Anesthesia Type:  Not documented  No value filed.    Reportable Event: NO     PAIN: Uncomplicated   Sign Out status: Comfortable, Well controlled pain     PONV: No PONV   Sign Out status:  No Nausea or Vomiting     Neuro/Psych: Uneventful perioperative course   Sign Out Status: Preoperative baseline; Age appropriate mentation     Airway/Resp.: Uneventful perioperative course   Sign Out Status: Non labored breathing, age appropriate RR; Resp. Status within EXPECTED Parameters     CV: Uneventful perioperative course   Sign Out status: Appropriate BP and perfusion indices; Appropriate HR/Rhythm     Disposition:   Sign Out in:  Labor and Delivery  Disposition:  Labor and Delivery  Recovery Course: Uneventful  Follow-Up: Not required           Last Anesthesia Record Vitals:  CRNA VITALS  2020 1917 - 2020 2017      2020             NIBP:  103/60    NIBP Mean:  71    Ht Rate:  88          Last PACU Vitals:  Vitals Value Taken Time   /68 2020  3:34 PM   Temp 36.6  C (97.8  F) 2020  3:34 PM   Pulse 75 2020  3:34 PM   Resp 16 2020  3:34 PM   SpO2 98 % 2020 11:54 AM   Temp src     NIBP     Pulse     SpO2     Resp     Temp     Ht Rate     Temp 2           Electronically Signed By: Kassandra Gutierrez MD, 2020, 10:14 PM

## 2020-02-07 NOTE — PROGRESS NOTES
[facility]   Post-partum Note    Name:  Shavonne Vogel  MRN: [id number]    S: Pain well controlled.  Tolerating clear liquid diet without nausea or vomiting. Passing flatus, no BM.  Ambulating without dizziness at this time. No dizziness, headache, nausea or vomiting reported.  Lochia minimal.  Breastfeeding.  Plans discharge today.     O:   Patient Vitals for the past 24 hrs:   BP Temp Temp src Pulse Heart Rate Resp   20 2337 113/76 97.6  F (36.4  C) Oral -- 71 16   20 1534 100/68 97.8  F (36.6  C) Oral 75 -- 16   20 0946 112/67 97.5  F (36.4  C) Oral -- 68 16     Gen:  Resting comfortably, NAD  CV:  RRR, no murmur  Pulm:  CTAB, no wheezes  Abd:  Soft, appropriately ttp, non-distended. Fundus at umbilicus, firm and non-tender. Incision c/d/i.   Ext:  non-tender, LE edema b/l    Hgb:   Hemoglobin   Date Value Ref Range Status   2020 9.6 (L) 11.7 - 15.7 g/dL Final       Assessment/Plan:  40 year old  on POD #3 s/p PLTCS for arrest of descent.  - continue with routine postpartum management  Pain: Well-controlled with ibuprofen and tylenol, prn oxycodone.   Hgb: 12.8> >8.7>9.6. Will discharge home with oral iron.   Rh: pos  Rubella: imm  Feed: breast  BC: Progesterone only or combined OCP at 6w visit  Dispo: DC POD#3    Mishel Young MD  Ob/Gyn, PGY-2    [facility] staff:  Appreciate note by Dr. Young.  I have seen and examined the patient without the resident. I have reviewed, edited, and agree with the note.        Arelis Franklin MD, FACOG  2020  9:38 AM

## 2020-02-07 NOTE — PLAN OF CARE
Patient vital signs stable. Postpartum assessment within normal limits. Pain managed with tylenol and ibuprofen. Up ad claudette. Tolerating a regular diet. Encouraged to hand express after feedings. Patient able to get 5-8mls and spoon/cup feeding it to . Continue with plan of care

## 2020-02-07 NOTE — PLAN OF CARE
Postpartum mother VSS. Pain controlled with oral pain medications. Discharge instructions reviewed with mother and discharge medications provided. Mothers questions answered. Mother states she has pump at home. Spouse present for discharge instructions.  discharged to home with mother and father. Mother and father discharged at 10:52am . ID bands verified.

## 2020-02-07 NOTE — PLAN OF CARE
Data: Vital signs within normal limits. Postpartum checks within normal limits - see flow record. Patient eating and drinking normally. Patient able to empty bladder independently and is up ambulating. No apparent signs of infection. Incision healing well. Patient performing self cares and is able to care for infant. Breastfeeding on demand, latch checked. Encouraging patient to hand express after breastfeeding and instructed patient to call nurse for assistance with hand expression. Discussed the benefits of hand expression and skin to skin with patient.   Action: Pain has been adequately managed with oral medications. Patient also wearing abdominal binder for additional comfort and support.   Response: Positive attachment behaviors observed with infant. Support person, , present.   Plan: Continue with the plan of care.

## 2020-02-24 ENCOUNTER — HEALTH MAINTENANCE LETTER (OUTPATIENT)
Age: 40
End: 2020-02-24

## 2020-02-26 ENCOUNTER — TELEPHONE (OUTPATIENT)
Dept: OBGYN | Facility: CLINIC | Age: 40
End: 2020-02-26

## 2020-02-26 NOTE — TELEPHONE ENCOUNTER
Received vm from patient that she went on a walk today and is now having increased pain or discomfort.    Attempted to reach patient but got vm.  Asked her to call midwife on call if she is still symptomatic after clinic hours.

## 2020-02-27 NOTE — TELEPHONE ENCOUNTER
Returned call to patient. Text page states patient went for walk and then began having some increased pain. No answer so left message with clinic number.     JA Carvajal, BRANDONM

## 2020-02-27 NOTE — TELEPHONE ENCOUNTER
Pt calling to report increase in abdominal/incisional pain. Pt reports increased activity yesterday- since she was feeling well took a long walk with stroller that involved hills.  Describes pain as cramping, some relief from ibuprofen.  Denies any redness or discharge from incision. Discussed likely due to over-exertion in the past day.  Encouraged to rest over next couple days.  Discussed pain management with ibuprofen and tylenol, or one tablet of oxycodone.  Discussed limiting physical activity to shorter distances and paces, etc. If pain persists or worsens to call clinic.  Discussed follow-up in clinic for evaluation if desired. Pt verbalized understanding and agreement to plan. -JA Bashir CNM

## 2020-03-17 ENCOUNTER — OFFICE VISIT (OUTPATIENT)
Dept: OBGYN | Facility: CLINIC | Age: 40
End: 2020-03-17
Attending: ADVANCED PRACTICE MIDWIFE
Payer: COMMERCIAL

## 2020-03-17 VITALS
WEIGHT: 129.3 LBS | DIASTOLIC BLOOD PRESSURE: 72 MMHG | BODY MASS INDEX: 25.39 KG/M2 | HEIGHT: 60 IN | HEART RATE: 97 BPM | SYSTOLIC BLOOD PRESSURE: 107 MMHG

## 2020-03-17 DIAGNOSIS — Z30.430 ENCOUNTER FOR INSERTION OF INTRAUTERINE CONTRACEPTIVE DEVICE: ICD-10-CM

## 2020-03-17 DIAGNOSIS — D50.8 OTHER IRON DEFICIENCY ANEMIA: ICD-10-CM

## 2020-03-17 PROBLEM — Z36.89 ENCOUNTER FOR TRIAGE IN PREGNANT PATIENT: Status: RESOLVED | Noted: 2020-02-03 | Resolved: 2020-03-17

## 2020-03-17 PROBLEM — O09.519 SUPERVISION OF PRIMIGRAVIDA OF ADVANCED MATERNAL AGE, ANTEPARTUM: Status: RESOLVED | Noted: 2019-11-22 | Resolved: 2020-03-17

## 2020-03-17 LAB — HEMOGLOBIN: 10.6 G/DL (ref 11.7–15.7)

## 2020-03-17 PROCEDURE — G0463 HOSPITAL OUTPT CLINIC VISIT: HCPCS

## 2020-03-17 PROCEDURE — 58300 INSERT INTRAUTERINE DEVICE: CPT | Mod: ZF | Performed by: ADVANCED PRACTICE MIDWIFE

## 2020-03-17 PROCEDURE — 25000128 H RX IP 250 OP 636: Mod: ZF | Performed by: ADVANCED PRACTICE MIDWIFE

## 2020-03-17 PROCEDURE — 85018 HEMOGLOBIN: CPT | Mod: ZF | Performed by: ADVANCED PRACTICE MIDWIFE

## 2020-03-17 RX ORDER — LANOLIN ALCOHOL/MO/W.PET/CERES
1000 CREAM (GRAM) TOPICAL DAILY
Qty: 30 TABLET | Refills: 0 | Status: SHIPPED | OUTPATIENT
Start: 2020-03-17

## 2020-03-17 RX ORDER — MULTIVIT WITH MINERALS/LUTEIN
250 TABLET ORAL DAILY
Qty: 30 TABLET | Refills: 0 | Status: SHIPPED | OUTPATIENT
Start: 2020-03-17

## 2020-03-17 RX ORDER — FERROUS SULFATE 325(65) MG
325 TABLET ORAL
Qty: 30 TABLET | Refills: 0 | Status: SHIPPED | OUTPATIENT
Start: 2020-03-17

## 2020-03-17 RX ADMIN — LEVONORGESTREL 20 MCG: 52 INTRAUTERINE DEVICE INTRAUTERINE at 13:20

## 2020-03-17 ASSESSMENT — MIFFLIN-ST. JEOR: SCORE: 1178

## 2020-03-17 ASSESSMENT — PAIN SCALES - GENERAL: PAINLEVEL: NO PAIN (0)

## 2020-03-17 NOTE — LETTER
3/17/2020       RE: Shavonne Vogel  [address]  [facility] [address]     Dear Colleague,    Thank you for referring your patient, Shavonne Vogel, to the [facility] at [facility]. Please see a copy of my visit note below.    Nursing Notes:   Minh Leong LPN  3/17/2020  1:10 PM  Signed  SUBJECTIVE:   Shavonne Vogel is here for her 6-week postpartum checkup.     PHQ-9 score:    Hx of Abuse:       Delivery Date: 20.    Delivering provider:  Katia Singh MD.    Type of delivery:  .     Delivery complications: None prolong delivery  Infant gender:  boy, weight 7 pounds 3 oz.  Feeding Method:  .  Complications reported with feeding:  none, infant thriving .    Bleeding:  None.  Duration:  4 wks.  Menses resumed:  No  Bowel/Urinary problems:  No    Contraception Planned:  IUD Mirena  She  has not had intercourse since delivery..       ================================================================  Pt reports feeling well in the postpartum period. Reports mood has been good. Denies depression or anxiety.  Breastfeeding has been going well, denies issue with latch or supply. Denies bladder or bowel issues.   Pt's biggest concern is occasional incisional pain, typically after increased activity. Denies redness or discharge. Questions re: exercise.  Planning Mirena IUD, has had in the past.   2019 pap NILM, HPV neg  ================================================================  ROS: 10 point ROS neg other than the symptoms noted above in the HPI.   CONSTITUTIONAL: negative  RESPIRATORY: negative  CARDIOVASCULAR: negative  GI: negative    EXAM:  /72   Pulse 97   Ht 1.524 m (5')   Wt 58.7 kg (129 lb 4.8 oz)   LMP 2019   BMI 25.25 kg/m      General: healthy, alert and no distress  Psych: NEGATIVE  Last PHQ-9 score on record= No Value exists for the : HP#PHQ9  Breasts:  Lactating  Abdomen: Benign, Soft, flat, non-tender,  "No masses, organomegaly and Diastasis less than 1-2 FB  Incision:  well healed and dry and intact   Vulva:  Normal genitalia and Bartholin's, Urethra, Ryan's normal  Vagina:  normal with good muscle tone  Cervix:  Multiparous, and pink, moist, closed, without lesion or CMT.    Uterus:  fully involuted and non-tender    Adnexa:  Within normal limits and No masses, nodularity, tenderness  Recto-vaginal:   anus normal    IUD Insertion Note:      Time Out - \"Pause for the Cause\"  Just before the procedure begins, through verbal and active participation of team members, verify:    Initials   Patient Name    ank   Patient Date of Birth ank   Procedure to be performed  ank     Consent:  Written consent signed and scanned into medical record.    After informed consent was obtained from the patient, a speculum was placed in the vagina to visualized the cervix.  The cervix was then swabbed with a betadine prep x 3.   Tenaculum was placed at the 12 o'clock position on the cervix and the uterus sounded to 8.25 cm.  The Mirena  IUD was then placed in the usual fashion under sterile technique.  Strings were clipped about 2 cm from the cervical os.  Tenaculum was removed and cervix was hemostatic.  There were no complications.  The patient tolerated the procedure well.    Lot No: [id number]  Exp: 05/2022    ASSESSMENT:   Encounter Diagnoses   Name Primary?     Routine postpartum follow-up Yes     Encounter for insertion of intrauterine contraceptive device      Other iron deficiency anemia       Normal postpartum exam after c/s for arrest of descent  Pregnancy was complicated by: AMA.      PLAN:  Orders Placed This Encounter   Procedures     IUD (Insertion Intrauterine Device) [12458]     Hemoglobin POCT      Orders Placed This Encounter   Medications     ferrous sulfate (FEROSUL) 325 (65 Fe) MG tablet     Sig: Take 1 tablet (325 mg) by mouth daily (with breakfast)     Dispense:  30 tablet     Refill:  0     vitamin C (ASCORBIC " ACID) 250 MG tablet     Sig: Take 1 tablet (250 mg) by mouth daily     Dispense:  30 tablet     Refill:  0     cyanocobalamin (VITAMIN B-12) 1000 MCG tablet     Sig: Take 1 tablet (1,000 mcg) by mouth daily     Dispense:  30 tablet     Refill:  0     levonorgestrel (MIRENA) 20 MCG/24HR IUD 20 mcg      Risks and benefits of prescribed medications discussed.  Medication instructions reviewed.  Contraception methods discussed. Reviewed back-up method x 1 week after IUD insertion. Reviewed warning signs.  Reviewed pregnancy spacing for after , 12-18 months.  Encouraged to return for string check in 4-6 weeks, later depending on COVID19.  Signs and symptoms of postpartum depression/anxiety discussed  Discussed calcium intake, vitamins and supplements including Vitamin D. Encouraged to continue oral iron supplement at this time, rx sent to pharmacy.  Reviewed COVID19 restrictions, encouraged precautions (social distancing, handwashing, etc.) and options for clinic scheduling.   Reviewed expectations on postpartum healing and recovery. Discussed exercise and deferring until later time, slowly easing back into strenuous physical activity.   Follow up in 1 year, earlier as needed    Again, thank you for allowing me to participate in the care of your patient.      Sincerely,    JA Bashir CNM

## 2020-03-17 NOTE — NURSING NOTE
SUBJECTIVE:   Shavonne Vogel is here for her 6-week postpartum checkup.     PHQ-9 score:    Hx of Abuse:       Delivery Date: 20.    Delivering provider:  Katia Singh MD.    Type of delivery:  .     Delivery complications: None prolong delivery  Infant gender:  boy, weight 7 pounds 3 oz.  Feeding Method:  .  Complications reported with feeding:  none, infant thriving .    Bleeding:  None.  Duration:  4 wks.  Menses resumed:  No  Bowel/Urinary problems:  No    Contraception Planned:  IUD Mirena  She  has not had intercourse since delivery..

## 2020-03-17 NOTE — PROGRESS NOTES
Nursing Notes:   Minh Leong LPN  3/17/2020  1:10 PM  Signed  SUBJECTIVE:   Shavonne Vogel is here for her 6-week postpartum checkup.     PHQ-9 score:    Hx of Abuse:       Delivery Date: 20.    Delivering provider:  Katia Singh MD.    Type of delivery:  .     Delivery complications: None prolong delivery  Infant gender:  boy, weight 7 pounds 3 oz.  Feeding Method:  .  Complications reported with feeding:  none, infant thriving .    Bleeding:  None.  Duration:  4 wks.  Menses resumed:  No  Bowel/Urinary problems:  No    Contraception Planned:  IUD Mirena  She  has not had intercourse since delivery..       ================================================================  Pt reports feeling well in the postpartum period. Reports mood has been good. Denies depression or anxiety.  Breastfeeding has been going well, denies issue with latch or supply. Denies bladder or bowel issues.   Pt's biggest concern is occasional incisional pain, typically after increased activity. Denies redness or discharge. Questions re: exercise.  Planning Mirena IUD, has had in the past.   2019 pap NILM, HPV neg  ================================================================  ROS: 10 point ROS neg other than the symptoms noted above in the HPI.   CONSTITUTIONAL: negative  RESPIRATORY: negative  CARDIOVASCULAR: negative  GI: negative    EXAM:  /72   Pulse 97   Ht 1.524 m (5')   Wt 58.7 kg (129 lb 4.8 oz)   LMP 2019   BMI 25.25 kg/m      General: healthy, alert and no distress  Psych: NEGATIVE  Last PHQ-9 score on record= No Value exists for the : HP#PHQ9  Breasts:  Lactating  Abdomen: Benign, Soft, flat, non-tender, No masses, organomegaly and Diastasis less than 1-2 FB  Incision:  well healed and dry and intact   Vulva:  Normal genitalia and Bartholin's, Urethra, [place]'s normal  Vagina:  normal with good muscle tone  Cervix:  Multiparous, and pink, moist, closed, without lesion or CMT.   "  Uterus:  fully involuted and non-tender    Adnexa:  Within normal limits and No masses, nodularity, tenderness  Recto-vaginal:   anus normal    IUD Insertion Note:      Time Out - \"Pause for the Cause\"  Just before the procedure begins, through verbal and active participation of team members, verify:    Initials   Patient Name    ank   Patient Date of Birth ank   Procedure to be performed  ank     Consent:  Written consent signed and scanned into medical record.    After informed consent was obtained from the patient, a speculum was placed in the vagina to visualized the cervix.  The cervix was then swabbed with a betadine prep x 3.   Tenaculum was placed at the 12 o'clock position on the cervix and the uterus sounded to 8.25 cm.  The Mirena  IUD was then placed in the usual fashion under sterile technique.  Strings were clipped about 2 cm from the cervical os.  Tenaculum was removed and cervix was hemostatic.  There were no complications.  The patient tolerated the procedure well.    Lot No: [id number]  Exp: 05/2022    ASSESSMENT:   Encounter Diagnoses   Name Primary?     Routine postpartum follow-up Yes     Encounter for insertion of intrauterine contraceptive device      Other iron deficiency anemia       Normal postpartum exam after c/s for arrest of descent  Pregnancy was complicated by: AMA.      PLAN:  Orders Placed This Encounter   Procedures     IUD (Insertion Intrauterine Device) [[id number]]     Hemoglobin POCT      Orders Placed This Encounter   Medications     ferrous sulfate (FEROSUL) 325 (65 Fe) MG tablet     Sig: Take 1 tablet (325 mg) by mouth daily (with breakfast)     Dispense:  30 tablet     Refill:  0     vitamin C (ASCORBIC ACID) 250 MG tablet     Sig: Take 1 tablet (250 mg) by mouth daily     Dispense:  30 tablet     Refill:  0     cyanocobalamin (VITAMIN B-12) 1000 MCG tablet     Sig: Take 1 tablet (1,000 mcg) by mouth daily     Dispense:  30 tablet     Refill:  0     levonorgestrel (MIRENA) 20 " MCG/24HR IUD 20 mcg      Risks and benefits of prescribed medications discussed.  Medication instructions reviewed.  Contraception methods discussed. Reviewed back-up method x 1 week after IUD insertion. Reviewed warning signs.  Reviewed pregnancy spacing for after , 12-18 months.  Encouraged to return for string check in 4-6 weeks, later depending on COVID19.  Signs and symptoms of postpartum depression/anxiety discussed  Discussed calcium intake, vitamins and supplements including Vitamin D. Encouraged to continue oral iron supplement at this time, rx sent to pharmacy.  Reviewed COVID19 restrictions, encouraged precautions (social distancing, handwashing, etc.) and options for clinic scheduling.   Reviewed expectations on postpartum healing and recovery. Discussed exercise and deferring until later time, slowly easing back into strenuous physical activity.   Follow up in 1 year, earlier as needed

## 2020-03-28 ENCOUNTER — TELEPHONE (OUTPATIENT)
Dept: OBGYN | Facility: CLINIC | Age: 40
End: 2020-03-28

## 2020-03-28 DIAGNOSIS — Z20.822 SUSPECTED COVID-19 VIRUS INFECTION: ICD-10-CM

## 2020-03-28 NOTE — TELEPHONE ENCOUNTER
birth 20  Pt is breastfeeding her baby, had normal 6 week PP exam 3/17/20 with Mirena IUD insertion.  Pt calling with respiratory symptoms since yesterday with cough, fever of 100.4 and runny nose.  Asking if robitussin is OK with breastfeeding. Pt had called primary care line and was told there are not tests for her. She did not tell them she had a .  instucted pt to call pediatrician's office to review her symptoms and if she and baby should be tested.  Pt is wearing a mask, washing hands and still breastfeeding.  OK to use robitussin.  Reviewed if SOB, chest pain, increasing sx to go to ER.  Pt agreeable to call pediatrician.  is home with her. JA Escamilla CNM

## 2020-04-22 ENCOUNTER — TELEPHONE (OUTPATIENT)
Dept: OBGYN | Facility: CLINIC | Age: 40
End: 2020-04-22

## 2020-04-22 NOTE — TELEPHONE ENCOUNTER
Spoke with Shavonne who reports RIGHT breast pain with lumps. Has slight redness but was just massaging it.  Denies fever/chills, aches, fatigue, denies breast is warmer to touch than other.  She had just started using a new breast pump that is hands free and fits inside the bra.    Advised sounds more like clogged duct. Continue massaging and monitoring. If fever/chills, aches or fatigue develop - call back.  Can also call pediatrician office and talk with lactation specialist. She indicated understanding and agreed with plan.

## 2020-04-29 ENCOUNTER — MYC MEDICAL ADVICE (OUTPATIENT)
Dept: OBGYN | Facility: CLINIC | Age: 40
End: 2020-04-29

## 2020-04-29 NOTE — LETTER
April 29, 2020        Shavonne Vogel  [address] [facility] [address]    To Whom it May Concern:    Shavonne Vogel was seen in our office on 4/29/2020 and was given the following instructions:  Patient may return to work on 5/4/2020 with no restrictions since giving birth 2/4/2020.    Sincerely,          JA Moreno, BRANDONM

## 2020-05-26 ENCOUNTER — MYC MEDICAL ADVICE (OUTPATIENT)
Dept: OBGYN | Facility: CLINIC | Age: 40
End: 2020-05-26

## 2020-05-27 ENCOUNTER — TELEPHONE (OUTPATIENT)
Dept: OBGYN | Facility: CLINIC | Age: 40
End: 2020-05-27

## 2020-05-27 NOTE — TELEPHONE ENCOUNTER
Shavonne left message re: mychart with image of  site -- she had not heard back from RN yesterday when message was first sent.     Reviewed image and [employer] message.     Reached pt voicemail and left message. Advised image looks consistent with healing  incision but advised to call back to further discuss symptoms.

## 2020-05-28 NOTE — TELEPHONE ENCOUNTER
It looks like she has a keloid scar.  Her c/s was in 2/2020 per her chart.  She can see a dermatologist who might offer steroid injection, possibly laser treatment to help it thin/flatten.  This might be an out of pocket treatment, but a dermatologist would be able to discuss options.     Kate Louie MD, FACOG

## 2021-03-20 ENCOUNTER — IMMUNIZATION (OUTPATIENT)
Dept: NURSING | Facility: CLINIC | Age: 41
End: 2021-03-20
Payer: COMMERCIAL

## 2021-03-20 PROCEDURE — 91300 PR COVID VAC PFIZER DIL RECON 30 MCG/0.3 ML IM: CPT

## 2021-03-20 PROCEDURE — 0001A PR COVID VAC PFIZER DIL RECON 30 MCG/0.3 ML IM: CPT

## 2021-04-10 ENCOUNTER — IMMUNIZATION (OUTPATIENT)
Dept: NURSING | Facility: CLINIC | Age: 41
End: 2021-04-10
Payer: COMMERCIAL

## 2021-04-10 PROCEDURE — 91300 PR COVID VAC PFIZER DIL RECON 30 MCG/0.3 ML IM: CPT

## 2021-04-10 PROCEDURE — 0002A PR COVID VAC PFIZER DIL RECON 30 MCG/0.3 ML IM: CPT

## 2021-04-17 ENCOUNTER — HEALTH MAINTENANCE LETTER (OUTPATIENT)
Age: 41
End: 2021-04-17

## 2021-09-25 NOTE — LETTER
January 8, 2020        Shavonne Vogel  [address] [facility] [address]      To Whom It May Concern:    Shavonne Vogel is being seen in our clinic for prenatal care.      Shavonne has a health condition related to pregnancy that requires accommodation. Shavonne is able to continue working with a reasonable accommodation.    I recommend Shavonne be provided the following accommodation: please allow for schedule changes such as a flexible schedules or decreased shift times of two hours; as well as, small breaks throughout the shift (every 2-3 hours for a 15 minute break).      This limitation began on 12/30/19.  At this time, I anticipate that Shavonne will need an  accommodation for her limitation until her Estimated Date of Delivery: Feb 1, 2020.      Please feel free to contact us at [phone] for any questions or clarifications.       Thank you.     Sincerely,        JA Ruiz CNM               26-Mar-2021

## 2021-09-26 ENCOUNTER — HEALTH MAINTENANCE LETTER (OUTPATIENT)
Age: 41
End: 2021-09-26

## 2021-11-20 ENCOUNTER — OFFICE VISIT (OUTPATIENT)
Dept: URGENT CARE | Facility: URGENT CARE | Age: 41
End: 2021-11-20

## 2021-11-20 VITALS
SYSTOLIC BLOOD PRESSURE: 120 MMHG | DIASTOLIC BLOOD PRESSURE: 76 MMHG | HEART RATE: 45 BPM | OXYGEN SATURATION: 100 % | TEMPERATURE: 96.8 F
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
## 2021-11-20 PROCEDURE — 99203 OFFICE O/P NEW LOW 30 MIN: CPT | Performed by: FAMILY MEDICINE

## 2021-11-20 NOTE — PROGRESS NOTES
SUBJECTIVE:  Shavonne Vogel is a 41 year old female who presents with a chief complaint of an animal bite on the hands right.  She was bitten by a cat today.   Cicumstances of bite: approached animal.   Animal's immunizations not up to date   Cat was put down and is being tested for rabies    last tetanus booster within 10 years    Past Medical History:   Diagnosis Date     Abnormal Pap smear of cervix      Hx of colposcopy with cervical biopsy        Current Outpatient Medications:      amoxicillin-clavulanate (AUGMENTIN) 875-125 MG tablet, Take 1 tablet by mouth 2 times daily for 10 days, Disp: 20 tablet, Rfl: 0     Prenatal Multivit-Min-Fe-FA (PRENATAL VITAMINS PO), , Disp: , Rfl:      acetaminophen (TYLENOL) 325 MG tablet, Take 2 tablets (650 mg) by mouth every 6 hours as needed for mild pain Start after Delivery. (Patient not taking: Reported on 3/17/2020), Disp: 100 tablet, Rfl: 0     cyanocobalamin (VITAMIN B-12) 1000 MCG tablet, Take 1 tablet (1,000 mcg) by mouth daily (Patient not taking: Reported on 11/20/2021), Disp: 30 tablet, Rfl: 0     ferrous gluconate (FERGON) 324 (38 Fe) MG tablet, Take 1 tablet (324 mg) by mouth daily (with breakfast) (Patient not taking: Reported on 3/17/2020), Disp: 30 tablet, Rfl: 3     ferrous sulfate (FEROSUL) 325 (65 Fe) MG tablet, Take 1 tablet (325 mg) by mouth daily (with breakfast) (Patient not taking: Reported on 11/20/2021), Disp: 30 tablet, Rfl: 0     ibuprofen (ADVIL/MOTRIN) 600 MG tablet, Take 1 tablet (600 mg) by mouth every 6 hours as needed for moderate pain Start after delivery (Patient not taking: Reported on 3/17/2020), Disp: 60 tablet, Rfl: 0     Misc. Devices (BREAST PUMP) MISC, 1 each as needed (need to express breast milk) (Patient not taking: Reported on 3/17/2020), Disp: 1 each, Rfl: 0     oxyCODONE (ROXICODONE) 5 MG tablet, Take 1-2 tablets (5-10 mg) by mouth every 6 hours as needed for pain (Patient not taking: Reported on 3/17/2020), Disp: 3 tablet, Rfl:  0     senna-docusate (SENOKOT-S/PERICOLACE) 8.6-50 MG tablet, Take 1 tablet by mouth daily Start after delivery. (Patient not taking: Reported on 3/17/2020), Disp: 100 tablet, Rfl: 0     vitamin C (ASCORBIC ACID) 250 MG tablet, Take 1 tablet (250 mg) by mouth daily (Patient not taking: Reported on 11/20/2021), Disp: 30 tablet, Rfl: 0  Social History     Tobacco Use     Smoking status: Never Smoker     Smokeless tobacco: Never Used   Substance Use Topics     Alcohol use: Never       ROS:  CONSTITUTIONAL:NEGATIVE for fever, chills, change in weight    OBJECTIVE:  /76   Pulse (!) 45   Temp 96.8  F (36  C) (Tympanic)   LMP 11/18/2021   SpO2 100%   Breastfeeding Yes   GENERAL: healthy, alert no acute distress  SKIN: puncture wound of hands right  MS:extremities normal- no gross deformities noted    ASSESSMENT:    ICD-10-CM    1. Cat bite, initial encounter  W55.01XA amoxicillin-clavulanate (AUGMENTIN) 875-125 MG tablet

## 2022-05-08 ENCOUNTER — HEALTH MAINTENANCE LETTER (OUTPATIENT)
Age: 42
End: 2022-05-08

## 2023-01-04 ENCOUNTER — LAB REQUISITION (OUTPATIENT)
Dept: LAB | Facility: CLINIC | Age: 43
End: 2023-01-04

## 2023-01-04 DIAGNOSIS — O09.819 SUPERVISION OF PREGNANCY RESULTING FROM ASSISTED REPRODUCTIVE TECHNOLOGY, UNSPECIFIED TRIMESTER: ICD-10-CM

## 2023-01-04 LAB
BASOPHILS # BLD AUTO: 0.1 10E3/UL (ref 0–0.2)
BASOPHILS NFR BLD AUTO: 1 %
EOSINOPHIL # BLD AUTO: 0.1 10E3/UL (ref 0–0.7)
EOSINOPHIL NFR BLD AUTO: 1 %
ERYTHROCYTE [DISTWIDTH] IN BLOOD BY AUTOMATED COUNT: 14.7 % (ref 10–15)
HCT VFR BLD AUTO: 41 % (ref 35–47)
HGB BLD-MCNC: 12.8 G/DL (ref 11.7–15.7)
IMM GRANULOCYTES # BLD: 0.1 10E3/UL
IMM GRANULOCYTES NFR BLD: 1 %
LYMPHOCYTES # BLD AUTO: 1.5 10E3/UL (ref 0.8–5.3)
LYMPHOCYTES NFR BLD AUTO: 17 %
MCH RBC QN AUTO: 29.4 PG (ref 26.5–33)
MCHC RBC AUTO-ENTMCNC: 31.2 G/DL (ref 31.5–36.5)
MCV RBC AUTO: 94 FL (ref 78–100)
MONOCYTES # BLD AUTO: 0.5 10E3/UL (ref 0–1.3)
MONOCYTES NFR BLD AUTO: 5 %
NEUTROPHILS # BLD AUTO: 6.8 10E3/UL (ref 1.6–8.3)
NEUTROPHILS NFR BLD AUTO: 75 %
NRBC # BLD AUTO: 0 10E3/UL
NRBC BLD AUTO-RTO: 0 /100
PLATELET # BLD AUTO: 465 10E3/UL (ref 150–450)
RBC # BLD AUTO: 4.35 10E6/UL (ref 3.8–5.2)
WBC # BLD AUTO: 9.1 10E3/UL (ref 4–11)

## 2023-01-04 PROCEDURE — 87389 HIV-1 AG W/HIV-1&-2 AB AG IA: CPT | Performed by: ADVANCED PRACTICE MIDWIFE

## 2023-01-04 PROCEDURE — 86901 BLOOD TYPING SEROLOGIC RH(D): CPT | Performed by: ADVANCED PRACTICE MIDWIFE

## 2023-01-04 PROCEDURE — 86762 RUBELLA ANTIBODY: CPT | Performed by: ADVANCED PRACTICE MIDWIFE

## 2023-01-04 PROCEDURE — 87491 CHLMYD TRACH DNA AMP PROBE: CPT | Performed by: ADVANCED PRACTICE MIDWIFE

## 2023-01-04 PROCEDURE — 86780 TREPONEMA PALLIDUM: CPT | Performed by: ADVANCED PRACTICE MIDWIFE

## 2023-01-04 PROCEDURE — 87340 HEPATITIS B SURFACE AG IA: CPT | Performed by: ADVANCED PRACTICE MIDWIFE

## 2023-01-04 PROCEDURE — 87086 URINE CULTURE/COLONY COUNT: CPT | Performed by: ADVANCED PRACTICE MIDWIFE

## 2023-01-04 PROCEDURE — 85004 AUTOMATED DIFF WBC COUNT: CPT | Performed by: ADVANCED PRACTICE MIDWIFE

## 2023-01-05 LAB
ABO/RH(D): NORMAL
ANTIBODY SCREEN: NEGATIVE
C TRACH DNA SPEC QL PROBE+SIG AMP: NEGATIVE
HBV SURFACE AG SERPL QL IA: NONREACTIVE
HIV 1+2 AB+HIV1 P24 AG SERPL QL IA: NONREACTIVE
N GONORRHOEA DNA SPEC QL NAA+PROBE: NEGATIVE
RUBV IGG SERPL QL IA: 12.3 INDEX
RUBV IGG SERPL QL IA: POSITIVE
SPECIMEN EXPIRATION DATE: NORMAL
T PALLIDUM AB SER QL: NONREACTIVE

## 2023-01-06 LAB — BACTERIA UR CULT: NO GROWTH

## 2023-01-14 ENCOUNTER — HEALTH MAINTENANCE LETTER (OUTPATIENT)
Age: 43
End: 2023-01-14

## 2023-02-01 ENCOUNTER — LAB REQUISITION (OUTPATIENT)
Dept: LAB | Facility: CLINIC | Age: 43
End: 2023-02-01

## 2023-02-01 DIAGNOSIS — Z34.90 ENCOUNTER FOR SUPERVISION OF NORMAL PREGNANCY, UNSPECIFIED, UNSPECIFIED TRIMESTER: ICD-10-CM

## 2023-02-01 PROCEDURE — 82105 ALPHA-FETOPROTEIN SERUM: CPT | Performed by: OBSTETRICS & GYNECOLOGY

## 2023-02-03 LAB
# FETUSES US: NORMAL
AFP MOM SERPL: 1.12
AFP SERPL-MCNC: 37 NG/ML
AGE - REPORTED: 38 YR
CURRENT SMOKER: NO
FAMILY MEMBER DISEASES HX: NO
GA METHOD: NORMAL
GA: NORMAL WK
IDDM PATIENT QL: NO
INTEGRATED SCN PATIENT-IMP: NORMAL
SPECIMEN DRAWN SERPL: NORMAL

## 2023-04-05 ENCOUNTER — LAB REQUISITION (OUTPATIENT)
Dept: LAB | Facility: CLINIC | Age: 43
End: 2023-04-05

## 2023-04-05 DIAGNOSIS — Z36.89 ENCOUNTER FOR OTHER SPECIFIED ANTENATAL SCREENING: ICD-10-CM

## 2023-04-05 LAB — PLATELET # BLD AUTO: 287 10E3/UL (ref 150–450)

## 2023-04-05 PROCEDURE — 85049 AUTOMATED PLATELET COUNT: CPT | Performed by: OBSTETRICS & GYNECOLOGY

## 2023-06-02 ENCOUNTER — HEALTH MAINTENANCE LETTER (OUTPATIENT)
Age: 43
End: 2023-06-02

## 2023-06-26 ENCOUNTER — LAB REQUISITION (OUTPATIENT)
Dept: LAB | Facility: CLINIC | Age: 43
End: 2023-06-26
Payer: COMMERCIAL

## 2023-06-26 DIAGNOSIS — Z36.89 ENCOUNTER FOR OTHER SPECIFIED ANTENATAL SCREENING: ICD-10-CM

## 2023-06-26 DIAGNOSIS — Z36.85 ENCOUNTER FOR ANTENATAL SCREENING FOR STREPTOCOCCUS B: ICD-10-CM

## 2023-06-26 PROCEDURE — 87653 STREP B DNA AMP PROBE: CPT | Mod: ORL | Performed by: OBSTETRICS & GYNECOLOGY

## 2023-06-27 LAB — GP B STREP DNA SPEC QL NAA+PROBE: NEGATIVE

## 2023-08-29 ENCOUNTER — LAB REQUISITION (OUTPATIENT)
Dept: LAB | Facility: CLINIC | Age: 43
End: 2023-08-29

## 2023-08-29 DIAGNOSIS — Z12.4 ENCOUNTER FOR SCREENING FOR MALIGNANT NEOPLASM OF CERVIX: ICD-10-CM

## 2023-08-29 PROCEDURE — 87624 HPV HI-RISK TYP POOLED RSLT: CPT | Performed by: OBSTETRICS & GYNECOLOGY

## 2023-08-29 PROCEDURE — G0145 SCR C/V CYTO,THINLAYER,RESCR: HCPCS | Performed by: OBSTETRICS & GYNECOLOGY

## 2023-08-31 LAB
BKR LAB AP GYN ADEQUACY: NORMAL
BKR LAB AP GYN INTERPRETATION: NORMAL
BKR LAB AP HPV REFLEX: NORMAL
BKR LAB AP LMP: NORMAL
BKR LAB AP PREVIOUS ABNL DX: NORMAL
BKR LAB AP PREVIOUS ABNORMAL: NORMAL
PATH REPORT.COMMENTS IMP SPEC: NORMAL
PATH REPORT.COMMENTS IMP SPEC: NORMAL
PATH REPORT.RELEVANT HX SPEC: NORMAL

## 2023-09-04 LAB
HUMAN PAPILLOMA VIRUS 16 DNA: NEGATIVE
HUMAN PAPILLOMA VIRUS 18 DNA: NEGATIVE
HUMAN PAPILLOMA VIRUS FINAL DIAGNOSIS: NORMAL
HUMAN PAPILLOMA VIRUS OTHER HR: NEGATIVE

## 2024-02-05 NOTE — PLAN OF CARE
CW=194 bpm, PCDW=936/108 mmhg, SpO2=77.0 %, Resp=14 B/min, EtCO2=33 mmHg, Apnea=0 Seconds, Pain=0, Brittny=10, Ferrell=2, Comment=SR Patient vital signs stable. Postpartum assessment within normal limits. Pain managed with tylenol and ibuprofen. Patient eating and drinking normally. Patient able to empty bladder independently and is up ambulating. Breastfeeding well. Continue with plan of care

## 2024-02-11 ENCOUNTER — HEALTH MAINTENANCE LETTER (OUTPATIENT)
Age: 44
End: 2024-02-11

## 2024-06-30 ENCOUNTER — HEALTH MAINTENANCE LETTER (OUTPATIENT)
Age: 44
End: 2024-06-30

## 2024-12-26 ENCOUNTER — LAB REQUISITION (OUTPATIENT)
Dept: LAB | Facility: CLINIC | Age: 44
End: 2024-12-26

## 2024-12-26 DIAGNOSIS — R53.83 OTHER FATIGUE: ICD-10-CM

## 2024-12-26 LAB
ERYTHROCYTE [DISTWIDTH] IN BLOOD BY AUTOMATED COUNT: 12.4 % (ref 10–15)
HCT VFR BLD AUTO: 42.6 % (ref 35–47)
HGB BLD-MCNC: 14.4 G/DL (ref 11.7–15.7)
MCH RBC QN AUTO: 30.6 PG (ref 26.5–33)
MCHC RBC AUTO-ENTMCNC: 33.8 G/DL (ref 31.5–36.5)
MCV RBC AUTO: 90 FL (ref 78–100)
PLATELET # BLD AUTO: 352 10E3/UL (ref 150–450)
RBC # BLD AUTO: 4.71 10E6/UL (ref 3.8–5.2)
TSH SERPL DL<=0.005 MIU/L-ACNC: 1.58 UIU/ML (ref 0.3–4.2)
WBC # BLD AUTO: 5.9 10E3/UL (ref 4–11)

## 2024-12-26 PROCEDURE — 84443 ASSAY THYROID STIM HORMONE: CPT | Performed by: STUDENT IN AN ORGANIZED HEALTH CARE EDUCATION/TRAINING PROGRAM

## 2024-12-26 PROCEDURE — 85041 AUTOMATED RBC COUNT: CPT | Performed by: STUDENT IN AN ORGANIZED HEALTH CARE EDUCATION/TRAINING PROGRAM

## 2024-12-26 PROCEDURE — 85014 HEMATOCRIT: CPT | Performed by: STUDENT IN AN ORGANIZED HEALTH CARE EDUCATION/TRAINING PROGRAM

## (undated) DEVICE — BASIN SET MAJOR

## (undated) DEVICE — SUCTION CANISTER MEDIVAC LINER 1500ML W/LID 65651-515

## (undated) DEVICE — SOL WATER IRRIG 1000ML BOTTLE 07139-09

## (undated) DEVICE — PACK C-SECTION LF PL15OTA83B

## (undated) DEVICE — PREP CHLORAPREP 26ML TINTED ORANGE  260815

## (undated) DEVICE — SU VICRYL 0 CT-1 36" J346H

## (undated) DEVICE — SU PLAIN 3-0 CTX 27" 873H

## (undated) DEVICE — GLOVE ESTEEM POWDER FREE SMT 6.5  2D72PT65

## (undated) DEVICE — CATH TRAY FOLEY SURESTEP 16FR WDRAIN BAG STLK LATEX A300316A

## (undated) DEVICE — STOCKING SLEEVE COMPRESSION CALF LG

## (undated) DEVICE — SU MONOCRYL 4-0 PS-2 18" UND Y496G

## (undated) DEVICE — STRAP KNEE/BODY 31143004

## (undated) DEVICE — GLOVE PROTEXIS BLUE W/NEU-THERA 7.0  2D73EB70

## (undated) DEVICE — SOL NACL 0.9% IRRIG 1000ML BOTTLE 07138-09

## (undated) RX ORDER — PHENYLEPHRINE HCL IN 0.9% NACL 1 MG/10 ML
SYRINGE (ML) INTRAVENOUS
Status: DISPENSED
Start: 2020-02-04

## (undated) RX ORDER — MORPHINE SULFATE 1 MG/ML
INJECTION, SOLUTION EPIDURAL; INTRATHECAL; INTRAVENOUS
Status: DISPENSED
Start: 2020-02-04

## (undated) RX ORDER — FENTANYL CITRATE 50 UG/ML
INJECTION, SOLUTION INTRAMUSCULAR; INTRAVENOUS
Status: DISPENSED
Start: 2020-02-04